# Patient Record
Sex: MALE | Race: WHITE | NOT HISPANIC OR LATINO | Employment: OTHER | URBAN - NONMETROPOLITAN AREA
[De-identification: names, ages, dates, MRNs, and addresses within clinical notes are randomized per-mention and may not be internally consistent; named-entity substitution may affect disease eponyms.]

---

## 2021-05-31 ENCOUNTER — HOSPITAL ENCOUNTER (INPATIENT)
Facility: HOSPITAL | Age: 61
LOS: 2 days | Discharge: HOME | DRG: 045 | End: 2021-06-02
Attending: EMERGENCY MEDICINE | Admitting: STUDENT IN AN ORGANIZED HEALTH CARE EDUCATION/TRAINING PROGRAM
Payer: COMMERCIAL

## 2021-05-31 ENCOUNTER — APPOINTMENT (EMERGENCY)
Dept: CT IMAGING | Facility: HOSPITAL | Age: 61
DRG: 045 | End: 2021-05-31
Payer: COMMERCIAL

## 2021-05-31 DIAGNOSIS — H53.9 VISUAL DISTURBANCE: ICD-10-CM

## 2021-05-31 DIAGNOSIS — I63.9 CVA (CEREBRAL VASCULAR ACCIDENT) (HCC): ICD-10-CM

## 2021-05-31 DIAGNOSIS — I10 HYPERTENSION: Primary | ICD-10-CM

## 2021-05-31 PROBLEM — E78.5 HYPERLIPIDEMIA: Status: ACTIVE | Noted: 2021-05-31

## 2021-05-31 LAB
ANION GAP SERPL CALCULATED.3IONS-SCNC: 8 MMOL/L (ref 4–13)
APTT PPP: 30 SECONDS (ref 23–37)
ATRIAL RATE: 80 BPM
BUN SERPL-MCNC: 20 MG/DL (ref 5–25)
CALCIUM SERPL-MCNC: 9.1 MG/DL (ref 8.3–10.1)
CHLORIDE SERPL-SCNC: 101 MMOL/L (ref 100–108)
CO2 SERPL-SCNC: 26 MMOL/L (ref 21–32)
CREAT SERPL-MCNC: 0.98 MG/DL (ref 0.6–1.3)
ERYTHROCYTE [DISTWIDTH] IN BLOOD BY AUTOMATED COUNT: 16.1 % (ref 11.6–15.1)
GFR SERPL CREATININE-BSD FRML MDRD: 82 ML/MIN/1.73SQ M
GLUCOSE SERPL-MCNC: 113 MG/DL (ref 65–140)
GLUCOSE SERPL-MCNC: 79 MG/DL (ref 65–140)
HCT VFR BLD AUTO: 43.3 % (ref 36.5–49.3)
HGB BLD-MCNC: 14.6 G/DL (ref 12–17)
INR PPP: 0.95 (ref 0.84–1.19)
MCH RBC QN AUTO: 28.7 PG (ref 26.8–34.3)
MCHC RBC AUTO-ENTMCNC: 33.7 G/DL (ref 31.4–37.4)
MCV RBC AUTO: 85 FL (ref 82–98)
P AXIS: 61 DEGREES
PLATELET # BLD AUTO: 215 THOUSANDS/UL (ref 149–390)
PMV BLD AUTO: 10.5 FL (ref 8.9–12.7)
POTASSIUM SERPL-SCNC: 4.7 MMOL/L (ref 3.5–5.3)
PR INTERVAL: 164 MS
PROTHROMBIN TIME: 12.5 SECONDS (ref 11.6–14.5)
QRS AXIS: 44 DEGREES
QRSD INTERVAL: 102 MS
QT INTERVAL: 394 MS
QTC INTERVAL: 454 MS
RBC # BLD AUTO: 5.08 MILLION/UL (ref 3.88–5.62)
SARS-COV-2 RNA RESP QL NAA+PROBE: NEGATIVE
SODIUM SERPL-SCNC: 135 MMOL/L (ref 136–145)
T WAVE AXIS: 65 DEGREES
TROPONIN I SERPL-MCNC: <0.02 NG/ML
VENTRICULAR RATE: 80 BPM
WBC # BLD AUTO: 7.78 THOUSAND/UL (ref 4.31–10.16)

## 2021-05-31 PROCEDURE — 70498 CT ANGIOGRAPHY NECK: CPT

## 2021-05-31 PROCEDURE — 93010 ELECTROCARDIOGRAM REPORT: CPT | Performed by: INTERNAL MEDICINE

## 2021-05-31 PROCEDURE — U0005 INFEC AGEN DETEC AMPLI PROBE: HCPCS | Performed by: EMERGENCY MEDICINE

## 2021-05-31 PROCEDURE — 99223 1ST HOSP IP/OBS HIGH 75: CPT | Performed by: STUDENT IN AN ORGANIZED HEALTH CARE EDUCATION/TRAINING PROGRAM

## 2021-05-31 PROCEDURE — 84484 ASSAY OF TROPONIN QUANT: CPT | Performed by: EMERGENCY MEDICINE

## 2021-05-31 PROCEDURE — 96374 THER/PROPH/DIAG INJ IV PUSH: CPT

## 2021-05-31 PROCEDURE — 85730 THROMBOPLASTIN TIME PARTIAL: CPT | Performed by: EMERGENCY MEDICINE

## 2021-05-31 PROCEDURE — 85027 COMPLETE CBC AUTOMATED: CPT | Performed by: EMERGENCY MEDICINE

## 2021-05-31 PROCEDURE — 82948 REAGENT STRIP/BLOOD GLUCOSE: CPT

## 2021-05-31 PROCEDURE — 70496 CT ANGIOGRAPHY HEAD: CPT

## 2021-05-31 PROCEDURE — 36415 COLL VENOUS BLD VENIPUNCTURE: CPT | Performed by: EMERGENCY MEDICINE

## 2021-05-31 PROCEDURE — 80048 BASIC METABOLIC PNL TOTAL CA: CPT | Performed by: EMERGENCY MEDICINE

## 2021-05-31 PROCEDURE — 99285 EMERGENCY DEPT VISIT HI MDM: CPT | Performed by: EMERGENCY MEDICINE

## 2021-05-31 PROCEDURE — 99285 EMERGENCY DEPT VISIT HI MDM: CPT

## 2021-05-31 PROCEDURE — U0003 INFECTIOUS AGENT DETECTION BY NUCLEIC ACID (DNA OR RNA); SEVERE ACUTE RESPIRATORY SYNDROME CORONAVIRUS 2 (SARS-COV-2) (CORONAVIRUS DISEASE [COVID-19]), AMPLIFIED PROBE TECHNIQUE, MAKING USE OF HIGH THROUGHPUT TECHNOLOGIES AS DESCRIBED BY CMS-2020-01-R: HCPCS | Performed by: EMERGENCY MEDICINE

## 2021-05-31 PROCEDURE — 85610 PROTHROMBIN TIME: CPT | Performed by: EMERGENCY MEDICINE

## 2021-05-31 PROCEDURE — 93005 ELECTROCARDIOGRAM TRACING: CPT

## 2021-05-31 RX ORDER — PRAVASTATIN SODIUM 80 MG/1
80 TABLET ORAL
Status: DISCONTINUED | OUTPATIENT
Start: 2021-05-31 | End: 2021-05-31

## 2021-05-31 RX ORDER — ATORVASTATIN CALCIUM 40 MG/1
40 TABLET, FILM COATED ORAL
Status: DISCONTINUED | OUTPATIENT
Start: 2021-05-31 | End: 2021-06-02 | Stop reason: HOSPADM

## 2021-05-31 RX ORDER — ASPIRIN 81 MG/1
81 TABLET, CHEWABLE ORAL DAILY
Status: DISCONTINUED | OUTPATIENT
Start: 2021-05-31 | End: 2021-06-02 | Stop reason: HOSPADM

## 2021-05-31 RX ORDER — ATORVASTATIN CALCIUM 40 MG/1
40 TABLET, FILM COATED ORAL EVERY EVENING
Status: DISCONTINUED | OUTPATIENT
Start: 2021-05-31 | End: 2021-05-31

## 2021-05-31 RX ORDER — ASPIRIN 81 MG/1
TABLET, CHEWABLE ORAL
Status: ON HOLD | COMMUNITY
End: 2021-06-02 | Stop reason: SDUPTHER

## 2021-05-31 RX ORDER — ASPIRIN 81 MG/1
81 TABLET, CHEWABLE ORAL DAILY
Status: DISCONTINUED | OUTPATIENT
Start: 2021-05-31 | End: 2021-05-31

## 2021-05-31 RX ORDER — METOPROLOL TARTRATE 5 MG/5ML
5 INJECTION INTRAVENOUS ONCE
Status: COMPLETED | OUTPATIENT
Start: 2021-05-31 | End: 2021-05-31

## 2021-05-31 RX ORDER — ROSUVASTATIN CALCIUM 10 MG/1
TABLET, COATED ORAL
COMMUNITY
Start: 2021-03-08 | End: 2021-06-02 | Stop reason: HOSPADM

## 2021-05-31 RX ORDER — CLOPIDOGREL BISULFATE 75 MG/1
300 TABLET ORAL ONCE
Status: COMPLETED | OUTPATIENT
Start: 2021-05-31 | End: 2021-05-31

## 2021-05-31 RX ADMIN — CLOPIDOGREL BISULFATE 300 MG: 75 TABLET ORAL at 10:38

## 2021-05-31 RX ADMIN — ATORVASTATIN CALCIUM 40 MG: 40 TABLET, FILM COATED ORAL at 17:23

## 2021-05-31 RX ADMIN — METOROPROLOL TARTRATE 5 MG: 5 INJECTION, SOLUTION INTRAVENOUS at 10:39

## 2021-05-31 RX ADMIN — ASPIRIN 81 MG: 81 TABLET, CHEWABLE ORAL at 12:56

## 2021-05-31 RX ADMIN — IOHEXOL 85 ML: 350 INJECTION, SOLUTION INTRAVENOUS at 10:22

## 2021-05-31 RX ADMIN — ENOXAPARIN SODIUM 40 MG: 40 INJECTION SUBCUTANEOUS at 12:56

## 2021-05-31 NOTE — ASSESSMENT & PLAN NOTE
Patient presenting with blurring of vision in his right eye which was initially accompanied by weakness of his right upper and lower extremities  Patient came to the ED due to persistence of blurring of vision and stroke alert was called  Neurology consulted and recommended admission under stroke pathway  Patient was given Plavix 300 mg in the ED  Patient to be maintained on aspirin 81 mg daily  Atorvastatin 40 mg q h s    MRI tomorrow  TTE tomorrow  Follow-up lipid panel, hemoglobin A1c, B12 and folate  Neurology consulted, recommendations appreciated  Telemetry for now

## 2021-05-31 NOTE — QUICK NOTE
Called by  regarding stroke alert at 10:04, neuro response was immediate  68-year-old man  presenting with  Right arm and leg numbness and blurry vision  Patient reports symptoms started approximately 2200 yesterday evening  Patient notes that the sensory symptoms have resolved however still reports   Some blurry vision   -   Eeg reports possible slight right facial weakness on exam, otherwise no focal deficits   - NIHSS   One, right facial weakness  - LKW  2200 yesterday evening      CTH  unremarkable  CTA  unremarkable      IV tpa was  Not given due to low NIH stroke scale score/ non disabling symptoms/ outside time window      Recommend   Loading Plavix 300 mg times once then continue 75 mg daily     -Continue aspirin 81 mg daily from home  -Recommend admitting to stroke workup     -Permissive hypertension, goal systolic  Less than 942   -MRI brain   -Echocardiogram     -FLP, A1c, B12, folate,   - tele monitoring  -PT /OT eval and treat      - will follow

## 2021-05-31 NOTE — CONSULTS
Consult for stroke  PT presented with Right arm and leg numbness and blurry vision, work up for stroke, per H&P PT with hx of high cholesterol  Reviewed labs, currently no lipid panel, no HbA1c obtained at this time  PT reported no issues with chewing or swallowing, able to feed self with no concerns,  no n/v/d, no weight change concerns, BMI 29 4  PT doesn't follow any special diet at home, no food intolerances or food allergies  PT usually eats 2 meals a day, typically skips B, burger and fries for L and chicken/fish/meat with starch for D  Due to hx of high cholesterol offered heart healthy diet education but PT declined  Recommend cardiac diet  Will continue to monitor labs and plan of care

## 2021-05-31 NOTE — PLAN OF CARE
Problem: NEUROSENSORY - ADULT  Goal: Achieves stable or improved neurological status  Description: INTERVENTIONS  - Monitor and report changes in neurological status  - Monitor vital signs such as temperature, blood pressure, glucose, and any other labs ordered   - Initiate measures to prevent increased intracranial pressure  - Monitor for seizure activity and implement precautions if appropriate      Outcome: Progressing  Goal: Remains free of injury related to seizures activity  Description: INTERVENTIONS  - Maintain airway, patient safety  and administer oxygen as ordered  - Monitor patient for seizure activity, document and report duration and description of seizure to physician/advanced practitioner  - If seizure occurs,  ensure patient safety during seizure  - Reorient patient post seizure  - Seizure pads on all 4 side rails  - Instruct patient/family to notify RN of any seizure activity including if an aura is experienced  - Instruct patient/family to call for assistance with activity based on nursing assessment  - Administer anti-seizure medications if ordered    Outcome: Progressing  Goal: Achieves maximal functionality and self care  Description: INTERVENTIONS  - Monitor swallowing and airway patency with patient fatigue and changes in neurological status  - Encourage and assist patient to increase activity and self care     - Encourage visually impaired, hearing impaired and aphasic patients to use assistive/communication devices  Outcome: Progressing     Problem: CARDIOVASCULAR - ADULT  Goal: Maintains optimal cardiac output and hemodynamic stability  Description: INTERVENTIONS:  - Monitor I/O, vital signs and rhythm  - Monitor for S/S and trends of decreased cardiac output  - Administer and titrate ordered vasoactive medications to optimize hemodynamic stability  - Assess quality of pulses, skin color and temperature  - Assess for signs of decreased coronary artery perfusion  - Instruct patient to report change in severity of symptoms  Outcome: Progressing  Goal: Absence of cardiac dysrhythmias or at baseline rhythm  Description: INTERVENTIONS:  - Continuous cardiac monitoring, vital signs, obtain 12 lead EKG if ordered  - Administer antiarrhythmic and heart rate control medications as ordered  - Monitor electrolytes and administer replacement therapy as ordered  Outcome: Progressing     Problem: HEMATOLOGIC - ADULT  Goal: Maintains hematologic stability  Description: INTERVENTIONS  - Assess for signs and symptoms of bleeding or hemorrhage  - Monitor labs  - Administer supportive blood products/factors as ordered and appropriate  Outcome: Progressing     Problem: SAFETY ADULT  Goal: Patient will remain free of falls  Description: INTERVENTIONS:  - Assess patient frequently for physical needs  -  Identify cognitive and physical deficits and behaviors that affect risk of falls    -  [place] fall precautions as indicated by assessment   - Educate patient/family on patient safety including physical limitations  - Instruct patient to call for assistance with activity based on assessment  - Modify environment to reduce risk of injury  - Consider OT/PT consult to assist with strengthening/mobility  Outcome: Progressing  Goal: Maintain or return to baseline ADL function  Description: INTERVENTIONS:  -  Assess patient's ability to carry out ADLs; assess patient's baseline for ADL function and identify physical deficits which impact ability to perform ADLs (bathing, care of mouth/teeth, toileting, grooming, dressing, etc )  - Assess/evaluate cause of self-care deficits   - Assess range of motion  - Assess patient's mobility; develop plan if impaired  - Assess patient's need for assistive devices and provide as appropriate  - Encourage maximum independence but intervene and supervise when necessary  - Involve family in performance of ADLs  - Assess for home care needs following discharge   - Consider OT consult to assist with ADL evaluation and planning for discharge  - Provide patient education as appropriate  Outcome: Progressing  Goal: Maintain or return mobility status to optimal level  Description: INTERVENTIONS:  - Assess patient's baseline mobility status (ambulation, transfers, stairs, etc )    - Identify cognitive and physical deficits and behaviors that affect mobility  - Identify mobility aids required to assist with transfers and/or ambulation (gait belt, sit-to-stand, lift, walker, cane, etc )  - [place] fall precautions as indicated by assessment  - Record patient progress and toleration of activity level on Mobility SBAR; progress patient to next Phase/Stage  - Instruct patient to call for assistance with activity based on assessment  - Consider rehabilitation consult to assist with strengthening/weightbearing, etc   Outcome: Progressing     Problem: Knowledge Deficit  Goal: Patient/family/caregiver demonstrates understanding of disease process, treatment plan, medications, and discharge instructions  Description: Complete learning assessment and assess knowledge base    Interventions:  - Provide teaching at level of understanding  - Provide teaching via preferred learning methods  Outcome: Progressing

## 2021-05-31 NOTE — ASSESSMENT & PLAN NOTE
Patient had not been following with a primary care provider for 10 years up until recently (around March)  Blood pressure has been elevated however was not started on any antihypertensives by his new primary care provider  Will do permissive hypertension for now as per Neurology recommendations  Patient will likely need to be started on an antihypertensive prior to discharge  Continue BP monitoring

## 2021-05-31 NOTE — ASSESSMENT & PLAN NOTE
· Patient presenting with blurring of vision in his right eye which was initially accompanied by weakness of his right upper and lower extremities the numbness and weakness has resolved his right eye blurry vision is improving  · Patient had an MRI of the brain done- Acute infarct left occipital region in the distribution of the left posterior cerebral artery  · Patient was loaded with Plavix 300 mg daily to continue Plavix 75 mg p o  Daily along with his aspirin 81 mg daily as recommended by Neurology formal consult to be done today  · Atorvastatin 40 mg q h s  LDL is uncontrolled  · His 2D echo has no thrombus or any indication of a PFO listed  · Hemoglobin A1c is 5 9 in prediabetic range  Patient will need diet controlled  · No AFib on telemetry  · CTA of the head and neck vessels- Mild smooth left ICA stenosis secondary to atheromatous plaque  Stenosis is measured at less than 50%    · Sidney Shorts is more than 24 hours out of the acute stroke will start lowering the blood pressure will start lisinopril 20 mg daily as his blood pressure is uncontrolled

## 2021-05-31 NOTE — ED PROVIDER NOTES
History  Chief Complaint   Patient presents with    Decreased Visual Acuity     pt noticed rightarm and righted numbness @ 10pm lastnight  shortly thereafter noticed black spots in his vision that are still there  Numbness is gone since midnight  Patient states that last night around 10:00 p m  He developed right-sided numbness including his arm and leg  No change in speech  Complains of blurry vision with spots in front of the his right visual field since yesterday  The right-sided numbness resolved after 1 hour  However, the patient continues to complain of blurry vision  No chest pain or shortness of breath  No headaches  Takes aspirin daily  States he had a similar episode 2 years ago but was never seen for it  Not diabetic  Does have a history of high cholesterol  History provided by:  Patient   used: No    CVA/TIA-like Symptoms  Presenting symptoms: sensory loss and visual change    Presenting symptoms: no headaches    Last known well instant: 10:00 p m  Last night  Onset quality:  Sudden  Duration:  1 hour  Timing:  Constant  Progression:  Improving  Similar to previous episodes: yes    Associated symptoms: no chest pain, no trouble swallowing, no dizziness, no fever, no hearing loss, no nausea, no neck pain, no seizures, no tinnitus and no vomiting        Prior to Admission Medications   Prescriptions Last Dose Informant Patient Reported? Taking?   aspirin (Aspirin 81) 81 mg chewable tablet 5/30/2021 at Unknown time  Yes Yes   Sig: Chew   rosuvastatin (CRESTOR) 10 MG tablet 5/30/2021 at Unknown time  Yes Yes   Sig: Take by mouth      Facility-Administered Medications: None       Past Medical History:   Diagnosis Date    History of high cholesterol     Hypertension        Past Surgical History:   Procedure Laterality Date    HERNIA REPAIR         History reviewed  No pertinent family history    I have reviewed and agree with the history as documented  E-Cigarette/Vaping     E-Cigarette/Vaping Substances     Social History     Tobacco Use    Smoking status: Never Smoker    Smokeless tobacco: Never Used   Substance Use Topics    Alcohol use: Yes     Frequency: 4 or more times a week     Drinks per session: 3 or 4     Binge frequency: Never    Drug use: Never       Review of Systems   Constitutional: Negative for chills and fever  HENT: Negative for ear pain, hearing loss, sore throat, tinnitus, trouble swallowing and voice change  Eyes: Positive for visual disturbance  Negative for pain and discharge  Respiratory: Negative for cough, shortness of breath and wheezing  Cardiovascular: Negative for chest pain and palpitations  Gastrointestinal: Negative for abdominal pain, blood in stool, constipation, diarrhea, nausea and vomiting  Genitourinary: Negative for dysuria, flank pain, frequency and hematuria  Musculoskeletal: Negative for joint swelling, neck pain and neck stiffness  Skin: Negative for rash and wound  Neurological: Positive for numbness  Negative for dizziness, seizures, syncope, facial asymmetry and headaches  Psychiatric/Behavioral: Negative for hallucinations, self-injury and suicidal ideas  All other systems reviewed and are negative  Physical Exam  Physical Exam  Vitals signs and nursing note reviewed  Constitutional:       General: He is not in acute distress  Appearance: He is well-developed  HENT:      Head: Normocephalic and atraumatic  Right Ear: External ear normal       Left Ear: External ear normal       Mouth/Throat:      Comments: Questionable right facial droop  Patient states he does not have any symptoms per him  Eyes:      Conjunctiva/sclera: Conjunctivae normal       Pupils: Pupils are equal, round, and reactive to light  Neck:      Musculoskeletal: Normal range of motion and neck supple  Cardiovascular:      Rate and Rhythm: Normal rate and regular rhythm        Heart sounds: Normal heart sounds  No murmur  Pulmonary:      Effort: Pulmonary effort is normal       Breath sounds: Normal breath sounds  No wheezing or rales  Abdominal:      General: Bowel sounds are normal  There is no distension  Palpations: Abdomen is soft  Tenderness: There is no abdominal tenderness  There is no guarding or rebound  Musculoskeletal: Normal range of motion  General: No deformity  Skin:     General: Skin is warm and dry  Findings: No rash  Neurological:      General: No focal deficit present  Mental Status: He is alert and oriented to person, place, and time  Cranial Nerves: No cranial nerve deficit  Psychiatric:         Behavior: Behavior normal          Vital Signs  ED Triage Vitals [05/31/21 0951]   Temperature Pulse Respirations Blood Pressure SpO2   97 6 °F (36 4 °C) 84 18 (!) 192/113 97 %      Temp Source Heart Rate Source Patient Position - Orthostatic VS BP Location FiO2 (%)   Temporal Monitor Lying Left arm --      Pain Score       No Pain           Vitals:    05/31/21 1030 05/31/21 1045 05/31/21 1100 05/31/21 1110   BP:   (!) 164/109    Pulse: 80 68 64 60   Patient Position - Orthostatic VS:             Visual Acuity  Visual Acuity      Most Recent Value   L Pupil Size (mm)  4   R Pupil Size (mm)  4          ED Medications  Medications   iohexol (OMNIPAQUE) 350 MG/ML injection (SINGLE-DOSE) 85 mL (85 mL Intravenous Given 5/31/21 1022)   metoprolol (LOPRESSOR) injection 5 mg (5 mg Intravenous Given 5/31/21 1039)   clopidogrel (PLAVIX) tablet 300 mg (300 mg Oral Given 5/31/21 1038)       Diagnostic Studies  Results Reviewed     Procedure Component Value Units Date/Time    Troponin I [[id number]]  (Normal) Collected: 05/31/21 1005    Lab Status: Final result Specimen: Blood from Arm, Left Updated: 05/31/21 1038     Troponin I <0 02 ng/mL     Novel Coronavirus (Covid-19),PCR [facility] - 2 hour stat [[id number]] Collected: 05/31/21 1005    Lab Status:  In process Specimen: Nares from Nose Updated: 05/31/21 9082    Basic metabolic panel [[id number]]  (Abnormal) Collected: 05/31/21 1005    Lab Status: Final result Specimen: Blood from Arm, Left Updated: 05/31/21 1035     Sodium 135 mmol/L      Potassium 4 7 mmol/L      Chloride 101 mmol/L      CO2 26 mmol/L      ANION GAP 8 mmol/L      BUN 20 mg/dL      Creatinine 0 98 mg/dL      Glucose 113 mg/dL      Calcium 9 1 mg/dL      eGFR 82 ml/min/1 73sq m     Narrative:      [place] guidelines for Chronic Kidney Disease (CKD):     Stage 1 with normal or high GFR (GFR > 90 mL/min/1 73 square meters)    Stage 2 Mild CKD (GFR = 60-89 mL/min/1 73 square meters)    Stage 3A Moderate CKD (GFR = 45-59 mL/min/1 73 square meters)    Stage 3B Moderate CKD (GFR = 30-44 mL/min/1 73 square meters)    Stage 4 Severe CKD (GFR = 15-29 mL/min/1 73 square meters)    Stage 5 End Stage CKD (GFR <15 mL/min/1 73 square meters)  Note: GFR calculation is accurate only with a steady state creatinine    Protime-INR [[id number]]  (Normal) Collected: 05/31/21 1005    Lab Status: Final result Specimen: Blood from Arm, Left Updated: 05/31/21 1034     Protime 12 5 seconds      INR 0 95    APTT [[id number]]  (Normal) Collected: 05/31/21 1005    Lab Status: Final result Specimen: Blood from Arm, Left Updated: 05/31/21 1034     PTT 30 seconds     CBC and Platelet [[id number]]  (Abnormal) Collected: 05/31/21 1005    Lab Status: Final result Specimen: Blood from Arm, Left Updated: 05/31/21 1018     WBC 7 78 Thousand/uL      RBC 5 08 Million/uL      Hemoglobin 14 6 g/dL      Hematocrit 43 3 %      MCV 85 fL      MCH 28 7 pg      MCHC 33 7 g/dL      RDW 16 1 %      Platelets 731 Thousands/uL      MPV 10 5 fL     Fingerstick Glucose (POCT) [[id number]]  (Normal) Collected: 05/31/21 0957    Lab Status: Final result Updated: 05/31/21 0959     POC Glucose 79 mg/dl                  CT stroke alert brain   Final Result by Wilfredo Su Ab Odom DO (05/31 1031)      Loss of gray-white differentiation left occipital lobe suspicious for recent infarct  Findings were directly discussed with Daniel Poe on 5/31/2021 10:28 AM       Workstation performed: [id number]         CTA stroke alert (head/neck)   Final Result by Stella Lamb DO (05/31 1048)      Mild smooth left ICA stenosis secondary to atheromatous plaque  Stenosis is measured at less than 50%  No focal intracranial stenosis or aneurysm  Findings were directly discussed with Daniel Poe on 5/31/2021 10:31 AM                      Workstation performed: [id number]                    Procedures  ECG 12 Lead Documentation Only    Date/Time: 5/31/2021 9:53 AM  Performed by: Yair Jane MD  Authorized by: Yair Jane MD     ECG reviewed by me, the ED Provider: yes    Patient location:  ED  Previous ECG:     Previous ECG:  Unavailable  Rate:     ECG rate:  80  Rhythm:     Rhythm: sinus rhythm    Ectopy:     Ectopy: none    QRS:     QRS axis:  Normal             ED Course  ED Course as of May 31 1111   Mon May 31, 2021   0956 Stroke alert called at 9:50 a m                       Stroke Assessment     Row Name 05/31/21 1454             NIH Stroke Scale    Interval  Baseline      Level of Consciousness (1a )  0      LOC Questions (1b )  0      LOC Commands (1c )  0      Best Gaze (2 )  0      Visual (3 )  0      Facial Palsy (4 )  1 There is a questionable very minor right facial droop  Patient has no symptom all G  Unsure if this is his baseline  Motor Arm, Left (5a )  0      Motor Arm, Right (5b )  0      Motor Leg, Left (6a )  0      Motor Leg, Right (6b )  0      Limb Ataxia (7 )  0      Sensory (8 )  0      Best Language (9 )  0      Dysarthria (10 )  0      Extinction and Inattention (11 ) (Formerly Neglect)  0      Total  1          First Filed Value   TPA Decision  Patient not a TPA candidate  [Symptoms started at 10:00 p m  Last night  Patient is out of the tPA window ]                  SBIRT 20yo+      Most Recent Value   SBIRT (24 yo +)   In order to provide better care to our patients, we are screening all of our patients for alcohol and drug use  Would it be okay to ask you these screening questions? Yes Filed at: 05/31/2021 1043   Initial Alcohol Screen: US AUDIT-C    1  How often do you have a drink containing alcohol? 6 Filed at: 05/31/2021 1043   2  How many drinks containing alcohol do you have on a typical day you are drinking? 1 Filed at: 05/31/2021 1043   3a  Male UNDER 65: How often do you have five or more drinks on one occasion? 0 Filed at: 05/31/2021 1043   Audit-C Score  7 Filed at: 05/31/2021 1043   Full Alcohol Screen: US AUDIT   4  How often during the last year have you found that you were not able to stop drinking once you had started? 0 Filed at: 05/31/2021 1043   5  How often during past year have you failed to do what was normally expected of you because of drinking? 0 Filed at: 05/31/2021 1043   6  How often in past year have you needed a first drink in the morning to get yourself going after a heavy drinking session? 0 Filed at: 05/31/2021 1043   7  How often in past year have you had feeling of guilt or remorse after drinking? 0 Filed at: 05/31/2021 1043   8  How often in past year have you been unable to remember what happened night before because you had been drinking? 0 Filed at: 05/31/2021 1043   9  Have you or someone else been injured as a result of your drinking? 0 Filed at: 05/31/2021 1043   10  Has a relative, friend, doctor or other health worker been concerned about your drinking and suggested you cut down?   0 Filed at: 05/31/2021 1043   AUDIT Total Score  7 Filed at: 05/31/2021 1043   KASEY: How many times in the past year have you    Used an illegal drug or used a prescription medication for non-medical reasons?   Never Filed at: 05/31/2021 1043                    MDM  Number of Diagnoses or Management Options  Hypertension:   TIA (transient ischemic attack):   Visual disturbance:   Diagnosis management comments: Case was discussed with Neurology on-call, Dr Angel Tavares  Patient is not a tPA candidate as the symptoms started over 12 hours ago  He recommends admit for stroke pathway  Plavix 300 mg once then continue at 75 mg daily  Amount and/or Complexity of Data Reviewed  Clinical lab tests: reviewed  Review and summarize past medical records: yes        Disposition  Final diagnoses:   Hypertension   Visual disturbance   CVA (cerebral vascular accident) ([facility] [address] )     Time reflects when diagnosis was documented in both MDM as applicable and the Disposition within this note     Time User Action Codes Description Comment    5/31/2021 10:32 AM Charisma Lundy Castellaheriberto Add [I10] Hypertension     5/31/2021 10:32 AM Charisma Lundy Castellani Add [G45 9] TIA (transient ischemic attack)     5/31/2021 10:32 AM Jocelyn Lundy Add [H53 9] Visual disturbance     5/31/2021 11:11 AM Charisma Lundy Castbunnyni Remove [G45 9] TIA (transient ischemic attack)     5/31/2021 11:11 AM Kamron Charisma Castellani Add [I63 9] CVA (cerebral vascular accident) [facility])       ED Disposition     ED Disposition Condition Date/Time Comment    Admit Stable Mon May 31, 2021 10:44 AM Case was discussed with Dr Imelda Painting and the patient's admission status was agreed to be  to the service of Dr Savannah Torres    None         Patient's Medications   Discharge Prescriptions    No medications on file     No discharge procedures on file      PDMP Review     None          ED Provider  Electronically Signed by           Destiny Oneil MD  05/31/21 [address] [facility] Pepe Waite MD  05/31/21 0716

## 2021-05-31 NOTE — H&P
[address] Nelson  H&P- Ludy Debra 1960, 56 y o  male MRN: [id number]  Unit/Bed#: -[facility] Encounter: [id number]  Primary Care Provider: Carmela Huerta DO   Date and time admitted to hospital: 5/31/2021  9:49 AM    Hyperlipidemia  Assessment & Plan  Continue aspirin and atorvastatin  Follow-up lipid panel    Essential hypertension  Assessment & Plan  Patient had not been following with a primary care provider for 10 years up until recently (around March)  Blood pressure has been elevated however was not started on any antihypertensives by his new primary care provider  Will do permissive hypertension for now as per Neurology recommendations  Patient will likely need to be started on an antihypertensive prior to discharge  Continue BP monitoring    CVA (cerebral vascular accident) [facility])  Assessment & Plan  Patient presenting with blurring of vision in his right eye which was initially accompanied by weakness of his right upper and lower extremities  Patient came to the ED due to persistence of blurring of vision and stroke alert was called  Neurology consulted and recommended admission under stroke pathway  Patient was given Plavix 300 mg in the ED  Patient to be maintained on aspirin 81 mg daily  Atorvastatin 40 mg q h s  MRI tomorrow  TTE tomorrow  Follow-up lipid panel, hemoglobin A1c, B12 and folate  Neurology consulted, recommendations appreciated  Telemetry for now      VTE Prophylaxis: Enoxaparin (Lovenox)  / sequential compression device   Code Status: FULL CODE  POLST: There is no POLST form on file for this patient (pre-hospital)  Discussion with family: patient and family    Anticipated Length of Stay:  Patient will be admitted on an Inpatient basis with an anticipated length of stay of  More than 2 midnights  Justification for Hospital Stay: stroke work-up    Total Time for Visit, including Counseling / Coordination of Care: 45 minutes    Greater than 50% of this total time spent on direct patient counseling and coordination of care  Chief Complaint:   Blurring of vision in right eye    History of Present Illness:    La A[place]as is a 56 y o  male who presents with blurring of vision in right eye that had started at about the same time as right upper and lower extremity weakness - 9 - 10 pm on the night prior to admission  The patient stated that he just went to bed despite his symptoms  When he woke up this am, the right upper and lower extremity weakness resolved however the blurring of vision in his right eye persisted prompting him to come to the ED  He endorses that he sees floaters and has some blurring of peripheral vision  Denies any other symptoms such as palpitations, lightheadedness, difficulty swallowing, difficulty ambulating  He does endorse some mild headache that seems to be related to his sinus congestion  Of note, the patient endorses a similar event in the past which resolved spontaneously  Review of Systems:    Review of Systems   Constitutional: Negative for chills and fever  HENT: Negative for ear pain and sore throat  Eyes: Positive for visual disturbance  Negative for pain  Respiratory: Negative for cough and shortness of breath  Cardiovascular: Negative for chest pain and palpitations  Gastrointestinal: Negative for abdominal pain and vomiting  Genitourinary: Negative for dysuria and hematuria  Musculoskeletal: Negative for arthralgias and back pain  Skin: Negative for color change and rash  Neurological: Positive for weakness and numbness  Negative for seizures and syncope  All other systems reviewed and are negative           Past Medical and Surgical History:     Past Medical History:   Diagnosis Date    History of high cholesterol     Hypertension        Past Surgical History:   Procedure Laterality Date    HERNIA REPAIR         Meds/Allergies:    Prior to Admission medications    Medication Sig Start Date End Date Taking? Authorizing Provider   aspirin (Aspirin 81) 81 mg chewable tablet Chew   Yes Historical Provider, MD   rosuvastatin (CRESTOR) 10 MG tablet Take by mouth 3/8/21  Yes Historical Provider, MD     I have reviewed home medications with patient personally  Allergies: No Known Allergies    Social History:     Marital Status: Single   Occupation: retired  Patient Pre-hospital Living Situation: at home  Patient Pre-hospital Level of Mobility: ambulates independently  Patient Pre-hospital Diet Restrictions: none  Substance Use History:   Social History     Substance and Sexual Activity   Alcohol Use Yes    Frequency: 4 or more times a week    Drinks per session: 3 or 4    Binge frequency: Never     Social History     Tobacco Use   Smoking Status Never Smoker   Smokeless Tobacco Never Used     Social History     Substance and Sexual Activity   Drug Use Never       Family History:    stroke - mother    Physical Exam:     Vitals:   Blood Pressure: (!) 177/115 (05/31/21 1330)  Pulse: 69 (05/31/21 1330)  Temperature: 98 °F (36 7 °C) (05/31/21 1330)  Temp Source: Oral (05/31/21 1230)  Respirations: 19 (05/31/21 1330)  Height: 5' 9" (175 3 cm) (05/31/21 0951)  Weight - Scale: 90 3 kg (199 lb 1 2 oz) (05/31/21 0951)  SpO2: 95 % (05/31/21 1330)    Physical Exam  Vitals signs and nursing note reviewed  Constitutional:       Appearance: He is well-developed  HENT:      Head: Normocephalic and atraumatic  Eyes:      Conjunctiva/sclera: Conjunctivae normal    Neck:      Musculoskeletal: Neck supple  Cardiovascular:      Rate and Rhythm: Normal rate and regular rhythm  Heart sounds: No murmur  Pulmonary:      Effort: Pulmonary effort is normal  No respiratory distress  Breath sounds: Normal breath sounds  Abdominal:      Palpations: Abdomen is soft  Tenderness: There is no abdominal tenderness  Skin:     General: Skin is warm and dry     Neurological:      Mental Status: He is alert and oriented to person, place, and time  Sensory: No sensory deficit  Motor: No weakness  Gait: Gait normal              Additional Data:     Lab Results: I have personally reviewed pertinent reports  Results from last 7 days   Lab Units 05/31/21  1005   WBC Thousand/uL 7 78   HEMOGLOBIN g/dL 14 6   HEMATOCRIT % 43 3   PLATELETS Thousands/uL 215     Results from last 7 days   Lab Units 05/31/21  1005   SODIUM mmol/L 135*   POTASSIUM mmol/L 4 7   CHLORIDE mmol/L 101   CO2 mmol/L 26   BUN mg/dL 20   CREATININE mg/dL 0 98   ANION GAP mmol/L 8   CALCIUM mg/dL 9 1   GLUCOSE RANDOM mg/dL 113     Results from last 7 days   Lab Units 05/31/21  1005   INR  0 95     Results from last 7 days   Lab Units 05/31/21  0957   POC GLUCOSE mg/dl 79               Imaging: I have personally reviewed pertinent reports  CT stroke alert brain   Final Result by Alexandria Goyal DO (05/31 1031)      Loss of gray-white differentiation left occipital lobe suspicious for recent infarct  Findings were directly discussed with Ben Dietz on 5/31/2021 10:28 AM       Workstation performed: [id number]         CTA stroke alert (head/neck)   Final Result by Alexandria Goyal DO (05/31 1048)      Mild smooth left ICA stenosis secondary to atheromatous plaque  Stenosis is measured at less than 50%  No focal intracranial stenosis or aneurysm  Findings were directly discussed with Ben Dietz on 5/31/2021 10:31 AM                      Workstation performed: [id number]         MRI Inpatient Order    (Results Pending)         Allscripts / Epic Records Reviewed: Yes     ** Please Note: This note has been constructed using a voice recognition system   **

## 2021-06-01 ENCOUNTER — APPOINTMENT (INPATIENT)
Dept: NON INVASIVE DIAGNOSTICS | Facility: HOSPITAL | Age: 61
DRG: 045 | End: 2021-06-01
Payer: COMMERCIAL

## 2021-06-01 ENCOUNTER — APPOINTMENT (INPATIENT)
Dept: MRI IMAGING | Facility: HOSPITAL | Age: 61
DRG: 045 | End: 2021-06-01
Payer: COMMERCIAL

## 2021-06-01 PROBLEM — R73.03 PREDIABETES: Status: ACTIVE | Noted: 2021-06-01

## 2021-06-01 LAB
ALBUMIN SERPL BCP-MCNC: 3.5 G/DL (ref 3.5–5)
ALP SERPL-CCNC: 79 U/L (ref 46–116)
ALT SERPL W P-5'-P-CCNC: 54 U/L (ref 12–78)
ANION GAP SERPL CALCULATED.3IONS-SCNC: 11 MMOL/L (ref 4–13)
AST SERPL W P-5'-P-CCNC: 32 U/L (ref 5–45)
BASOPHILS # BLD AUTO: 0.06 THOUSANDS/ΜL (ref 0–0.1)
BASOPHILS NFR BLD AUTO: 1 % (ref 0–1)
BILIRUB SERPL-MCNC: 0.62 MG/DL (ref 0.2–1)
BUN SERPL-MCNC: 19 MG/DL (ref 5–25)
CALCIUM SERPL-MCNC: 8.7 MG/DL (ref 8.3–10.1)
CHLORIDE SERPL-SCNC: 103 MMOL/L (ref 100–108)
CHOLEST SERPL-MCNC: 192 MG/DL (ref 50–200)
CO2 SERPL-SCNC: 24 MMOL/L (ref 21–32)
CREAT SERPL-MCNC: 1.14 MG/DL (ref 0.6–1.3)
EOSINOPHIL # BLD AUTO: 0.31 THOUSAND/ΜL (ref 0–0.61)
EOSINOPHIL NFR BLD AUTO: 5 % (ref 0–6)
ERYTHROCYTE [DISTWIDTH] IN BLOOD BY AUTOMATED COUNT: 16.1 % (ref 11.6–15.1)
EST. AVERAGE GLUCOSE BLD GHB EST-MCNC: 123 MG/DL
FOLATE SERPL-MCNC: >20 NG/ML (ref 3.1–17.5)
GFR SERPL CREATININE-BSD FRML MDRD: 69 ML/MIN/1.73SQ M
GLUCOSE SERPL-MCNC: 114 MG/DL (ref 65–140)
HBA1C MFR BLD: 5.9 %
HCT VFR BLD AUTO: 41.5 % (ref 36.5–49.3)
HDLC SERPL-MCNC: 56 MG/DL
HGB BLD-MCNC: 13.9 G/DL (ref 12–17)
IMM GRANULOCYTES # BLD AUTO: 0.02 THOUSAND/UL (ref 0–0.2)
IMM GRANULOCYTES NFR BLD AUTO: 0 % (ref 0–2)
LDLC SERPL CALC-MCNC: 104 MG/DL (ref 0–100)
LYMPHOCYTES # BLD AUTO: 1.39 THOUSANDS/ΜL (ref 0.6–4.47)
LYMPHOCYTES NFR BLD AUTO: 20 % (ref 14–44)
MAGNESIUM SERPL-MCNC: 2.1 MG/DL (ref 1.6–2.6)
MCH RBC QN AUTO: 28.5 PG (ref 26.8–34.3)
MCHC RBC AUTO-ENTMCNC: 33.5 G/DL (ref 31.4–37.4)
MCV RBC AUTO: 85 FL (ref 82–98)
MONOCYTES # BLD AUTO: 0.66 THOUSAND/ΜL (ref 0.17–1.22)
MONOCYTES NFR BLD AUTO: 10 % (ref 4–12)
NEUTROPHILS # BLD AUTO: 4.37 THOUSANDS/ΜL (ref 1.85–7.62)
NEUTS SEG NFR BLD AUTO: 64 % (ref 43–75)
NRBC BLD AUTO-RTO: 0 /100 WBCS
PHOSPHATE SERPL-MCNC: 3.6 MG/DL (ref 2.3–4.1)
PLATELET # BLD AUTO: 197 THOUSANDS/UL (ref 149–390)
PMV BLD AUTO: 10.6 FL (ref 8.9–12.7)
POTASSIUM SERPL-SCNC: 3.8 MMOL/L (ref 3.5–5.3)
PROT SERPL-MCNC: 7.3 G/DL (ref 6.4–8.2)
RBC # BLD AUTO: 4.88 MILLION/UL (ref 3.88–5.62)
SODIUM SERPL-SCNC: 138 MMOL/L (ref 136–145)
TRIGL SERPL-MCNC: 160 MG/DL
VIT B12 SERPL-MCNC: 309 PG/ML (ref 100–900)
WBC # BLD AUTO: 6.81 THOUSAND/UL (ref 4.31–10.16)

## 2021-06-01 PROCEDURE — 99232 SBSQ HOSP IP/OBS MODERATE 35: CPT | Performed by: FAMILY MEDICINE

## 2021-06-01 PROCEDURE — 85025 COMPLETE CBC W/AUTO DIFF WBC: CPT | Performed by: STUDENT IN AN ORGANIZED HEALTH CARE EDUCATION/TRAINING PROGRAM

## 2021-06-01 PROCEDURE — 80053 COMPREHEN METABOLIC PANEL: CPT | Performed by: STUDENT IN AN ORGANIZED HEALTH CARE EDUCATION/TRAINING PROGRAM

## 2021-06-01 PROCEDURE — 92610 EVALUATE SWALLOWING FUNCTION: CPT

## 2021-06-01 PROCEDURE — 80061 LIPID PANEL: CPT | Performed by: STUDENT IN AN ORGANIZED HEALTH CARE EDUCATION/TRAINING PROGRAM

## 2021-06-01 PROCEDURE — 83735 ASSAY OF MAGNESIUM: CPT | Performed by: STUDENT IN AN ORGANIZED HEALTH CARE EDUCATION/TRAINING PROGRAM

## 2021-06-01 PROCEDURE — 83036 HEMOGLOBIN GLYCOSYLATED A1C: CPT | Performed by: STUDENT IN AN ORGANIZED HEALTH CARE EDUCATION/TRAINING PROGRAM

## 2021-06-01 PROCEDURE — 93306 TTE W/DOPPLER COMPLETE: CPT

## 2021-06-01 PROCEDURE — 84100 ASSAY OF PHOSPHORUS: CPT | Performed by: STUDENT IN AN ORGANIZED HEALTH CARE EDUCATION/TRAINING PROGRAM

## 2021-06-01 PROCEDURE — G1004 CDSM NDSC: HCPCS

## 2021-06-01 PROCEDURE — 70551 MRI BRAIN STEM W/O DYE: CPT

## 2021-06-01 PROCEDURE — 82607 VITAMIN B-12: CPT | Performed by: STUDENT IN AN ORGANIZED HEALTH CARE EDUCATION/TRAINING PROGRAM

## 2021-06-01 PROCEDURE — 82746 ASSAY OF FOLIC ACID SERUM: CPT | Performed by: STUDENT IN AN ORGANIZED HEALTH CARE EDUCATION/TRAINING PROGRAM

## 2021-06-01 RX ORDER — LISINOPRIL 20 MG/1
20 TABLET ORAL DAILY
Status: DISCONTINUED | OUTPATIENT
Start: 2021-06-01 | End: 2021-06-02 | Stop reason: HOSPADM

## 2021-06-01 RX ORDER — CLOPIDOGREL BISULFATE 75 MG/1
75 TABLET ORAL DAILY
Status: DISCONTINUED | OUTPATIENT
Start: 2021-06-01 | End: 2021-06-02 | Stop reason: HOSPADM

## 2021-06-01 RX ADMIN — ENOXAPARIN SODIUM 40 MG: 40 INJECTION SUBCUTANEOUS at 08:24

## 2021-06-01 RX ADMIN — ASPIRIN 81 MG: 81 TABLET, CHEWABLE ORAL at 08:24

## 2021-06-01 RX ADMIN — CLOPIDOGREL BISULFATE 75 MG: 75 TABLET ORAL at 14:34

## 2021-06-01 RX ADMIN — LISINOPRIL 20 MG: 20 TABLET ORAL at 14:34

## 2021-06-01 RX ADMIN — ATORVASTATIN CALCIUM 40 MG: 40 TABLET, FILM COATED ORAL at 15:46

## 2021-06-01 NOTE — ASSESSMENT & PLAN NOTE
Patient had not been following with a primary care provider for 10 years up until recently (around March)  Uncontrolled past 24 hours of acute CVA will start lowering the blood pressure start lisinopril 20 mg daily

## 2021-06-01 NOTE — PROGRESS NOTES
[address] Christine Nelson  Progress Note - Chasity Kessler 1960, 56 y o  male MRN: [id number]  Unit/Bed#: -01 Encounter: [id number]  Primary Care Provider: Jairo Wagner DO   Date and time admitted to hospital: 5/31/2021  9:49 AM    Prediabetes  Assessment & Plan  · Hemoglobin A1c is 5 9 needs the outpatient diet control    Hyperlipidemia  Assessment & Plan  Continue aspirin and atorvastatin  Follow-up lipid panel uncontrolled LDL    Essential hypertension  Assessment & Plan  Patient had not been following with a primary care provider for 10 years up until recently (around March)  Uncontrolled past 24 hours of acute CVA will start lowering the blood pressure start lisinopril 20 mg daily    * CVA (cerebral vascular accident) [facility])  Assessment & Plan  · Patient presenting with blurring of vision in his right eye which was initially accompanied by weakness of his right upper and lower extremities the numbness and weakness has resolved his right eye blurry vision is improving  · Patient had an MRI of the brain done- Acute infarct left occipital region in the distribution of the left posterior cerebral artery  · Patient was loaded with Plavix 300 mg daily to continue Plavix 75 mg p o  Daily along with his aspirin 81 mg daily as recommended by Neurology formal consult to be done today  · Atorvastatin 40 mg q h s  LDL is uncontrolled  · His 2D echo has no thrombus or any indication of a PFO listed  · Hemoglobin A1c is 5 9 in prediabetic range  Patient will need diet controlled  · No AFib on telemetry  · CTA of the head and neck vessels- Mild smooth left ICA stenosis secondary to atheromatous plaque  Stenosis is measured at less than 50%    · Zilphia Holiday is more than 24 hours out of the acute stroke will start lowering the blood pressure will start lisinopril 20 mg daily as his blood pressure is uncontrolled        VTE Pharmacologic Prophylaxis:   Pharmacologic: Pharmacologic VTE Prophylaxis contraindicated due to He is ambulatory  Mechanical VTE Prophylaxis in Place: Yes    Patient Centered Rounds: I have performed bedside rounds with nursing staff today  Discussions with Specialists or Other Care Team Provider:  Will discuss with neurology    Education and Discussions with Family / Patient:  Patient    Time Spent for Care: 30 minutes  More than 50% of total time spent on counseling and coordination of care as described above  Current Length of Stay: 1 day(s)    Current Patient Status: Inpatient   Certification Statement: The patient will continue to require additional inpatient hospital stay due to Secondary to acute CVA hypertension uncontrolled    Discharge Plan:  Anticipate discharge tomorrow blood pressure is improved    Code Status: Level 1 - Full Code      Subjective:   Patient seen and examined he has right-sided weakness and paresthesias has resolved his right eye blurry vision has improved    Objective:     Vitals:   Temp (24hrs), Av 2 °F (36 8 °C), Min:97 7 °F (36 5 °C), Max:98 4 °F (36 9 °C)    Temp:  [97 7 °F (36 5 °C)-98 4 °F (36 9 °C)] 98 2 °F (36 8 °C)  HR:  [59-90] 59  Resp:  [18-24] 24  BP: (144-177)/() 158/109  SpO2:  [93 %-96 %] 96 %  Body mass index is 29 4 kg/m²  Input and Output Summary (last 24 hours): Intake/Output Summary (Last 24 hours) at 2021 1405  Last data filed at 2021 1730  Gross per 24 hour   Intake 420 ml   Output --   Net 420 ml       Physical Exam:     Physical Exam  Vitals signs and nursing note reviewed  Constitutional:       Appearance: He is well-developed  HENT:      Head: Normocephalic and atraumatic  Eyes:      Conjunctiva/sclera: Conjunctivae normal    Neck:      Musculoskeletal: Neck supple  Cardiovascular:      Rate and Rhythm: Normal rate and regular rhythm  Heart sounds: No murmur  Pulmonary:      Effort: Pulmonary effort is normal  No respiratory distress  Breath sounds: Normal breath sounds  Abdominal:      Palpations: Abdomen is soft  Tenderness: There is no abdominal tenderness  Skin:     General: Skin is warm and dry  Neurological:      Mental Status: He is alert and oriented to person, place, and time  Comments: He only has evidence of right-sided blurry vision otherwise no other focal abnormality noticed           Additional Data:     Labs:    Results from last 7 days   Lab Units 06/01/21  0535   WBC Thousand/uL 6 81   HEMOGLOBIN g/dL 13 9   HEMATOCRIT % 41 5   PLATELETS Thousands/uL 197   NEUTROS PCT % 64   LYMPHS PCT % 20   MONOS PCT % 10   EOS PCT % 5     Results from last 7 days   Lab Units 06/01/21  0535   SODIUM mmol/L 138   POTASSIUM mmol/L 3 8   CHLORIDE mmol/L 103   CO2 mmol/L 24   BUN mg/dL 19   CREATININE mg/dL 1 14   ANION GAP mmol/L 11   CALCIUM mg/dL 8 7   ALBUMIN g/dL 3 5   TOTAL BILIRUBIN mg/dL 0 62   ALK PHOS U/L 79   ALT U/L 54   AST U/L 32   GLUCOSE RANDOM mg/dL 114     Results from last 7 days   Lab Units 05/31/21  1005   INR  0 95     Results from last 7 days   Lab Units 05/31/21  0957   POC GLUCOSE mg/dl 79     Results from last 7 days   Lab Units 06/01/21  0535   HEMOGLOBIN A1C % 5 9*               * I Have Reviewed All Lab Data Listed Above  * Additional Pertinent Lab Tests Reviewed:  All Labs Within Last 24 Hours Reviewed    Imaging:    Imaging Reports Reviewed Today Include:  MRI of the brain and 2D echo  Imaging Personally Reviewed by Myself Includes:      Recent Cultures (last 7 days):           Last 24 Hours Medication List:   Current Facility-Administered Medications   Medication Dose Route Frequency Provider Last Rate    aspirin  81 mg Oral Daily Gege Vivi Steel MD      atorvastatin  40 mg Oral Daily With [employer] Noé Steel MD      clopidogrel  75 mg Oral Daily Ben Cheek MD      lisinopril  20 mg Oral Daily Ben Cheek MD          Today, Patient Was Seen By: Ben Cheek MD    ** Please Note: Dictation voice to text software may have been used in the creation of this document   **

## 2021-06-01 NOTE — OCCUPATIONAL THERAPY NOTE
Occupational Therapy Screen     Patient Name: Tammie Wilson  [id number] Date: 6/1/2021  Problem List  Principal Problem:    CVA (cerebral vascular accident) [facility])  Active Problems:    Essential hypertension    Hyperlipidemia       06/01/21 1005   Note Type   Note type Screen       OT orders received  Chart review completed  Pt admitted to [facility] 5/31/2021 with Dx: CVA  MRI of brain resulted, suggests: Acute infarct left occipital region in the distribution of the left posterior cerebral artery  Per RNBrunilda Pt is completing ADLs and functional in room mobility @ I with no difficulty noted  Pt is experiencing blurred vision in R eye although at this time is not impacting functional performance  Pt reports concerns with being able to drive although no other concerns at this time regarding returning home  Pt does not require any further acute OT services  D/c OT effective 6/1/2021  If new concerns arise, please re-consult       OH Wilson/L

## 2021-06-01 NOTE — PLAN OF CARE
Problem: NEUROSENSORY - ADULT  Goal: Achieves stable or improved neurological status  Description: INTERVENTIONS  - Monitor and report changes in neurological status  - Monitor vital signs such as temperature, blood pressure, glucose, and any other labs ordered   - Initiate measures to prevent increased intracranial pressure  - Monitor for seizure activity and implement precautions if appropriate      Outcome: Progressing  Goal: Remains free of injury related to seizures activity  Description: INTERVENTIONS  - Maintain airway, patient safety  and administer oxygen as ordered  - Monitor patient for seizure activity, document and report duration and description of seizure to physician/advanced practitioner  - If seizure occurs,  ensure patient safety during seizure  - Reorient patient post seizure  - Seizure pads on all 4 side rails  - Instruct patient/family to notify RN of any seizure activity including if an aura is experienced  - Instruct patient/family to call for assistance with activity based on nursing assessment  - Administer anti-seizure medications if ordered    Outcome: Progressing  Goal: Achieves maximal functionality and self care  Description: INTERVENTIONS  - Monitor swallowing and airway patency with patient fatigue and changes in neurological status  - Encourage and assist patient to increase activity and self care     - Encourage visually impaired, hearing impaired and aphasic patients to use assistive/communication devices  Outcome: Progressing     Problem: CARDIOVASCULAR - ADULT  Goal: Maintains optimal cardiac output and hemodynamic stability  Description: INTERVENTIONS:  - Monitor I/O, vital signs and rhythm  - Monitor for S/S and trends of decreased cardiac output  - Administer and titrate ordered vasoactive medications to optimize hemodynamic stability  - Assess quality of pulses, skin color and temperature  - Assess for signs of decreased coronary artery perfusion  - Instruct patient to report change in severity of symptoms  Outcome: Progressing  Goal: Absence of cardiac dysrhythmias or at baseline rhythm  Description: INTERVENTIONS:  - Continuous cardiac monitoring, vital signs, obtain 12 lead EKG if ordered  - Administer antiarrhythmic and heart rate control medications as ordered  - Monitor electrolytes and administer replacement therapy as ordered  Outcome: Progressing     Problem: HEMATOLOGIC - ADULT  Goal: Maintains hematologic stability  Description: INTERVENTIONS  - Assess for signs and symptoms of bleeding or hemorrhage  - Monitor labs  - Administer supportive blood products/factors as ordered and appropriate  Outcome: Progressing     Problem: SAFETY ADULT  Goal: Patient will remain free of falls  Description: INTERVENTIONS:  - Assess patient frequently for physical needs  -  Identify cognitive and physical deficits and behaviors that affect risk of falls    -  [place] fall precautions as indicated by assessment   - Educate patient/family on patient safety including physical limitations  - Instruct patient to call for assistance with activity based on assessment  - Modify environment to reduce risk of injury  - Consider OT/PT consult to assist with strengthening/mobility  Outcome: Progressing  Goal: Maintain or return to baseline ADL function  Description: INTERVENTIONS:  -  Assess patient's ability to carry out ADLs; assess patient's baseline for ADL function and identify physical deficits which impact ability to perform ADLs (bathing, care of mouth/teeth, toileting, grooming, dressing, etc )  - Assess/evaluate cause of self-care deficits   - Assess range of motion  - Assess patient's mobility; develop plan if impaired  - Assess patient's need for assistive devices and provide as appropriate  - Encourage maximum independence but intervene and supervise when necessary  - Involve family in performance of ADLs  - Assess for home care needs following discharge   - Consider OT consult to assist with ADL evaluation and planning for discharge  - Provide patient education as appropriate  Outcome: Progressing  Goal: Maintain or return mobility status to optimal level  Description: INTERVENTIONS:  - Assess patient's baseline mobility status (ambulation, transfers, stairs, etc )    - Identify cognitive and physical deficits and behaviors that affect mobility  - Identify mobility aids required to assist with transfers and/or ambulation (gait belt, sit-to-stand, lift, walker, cane, etc )  - [place] fall precautions as indicated by assessment  - Record patient progress and toleration of activity level on Mobility SBAR; progress patient to next Phase/Stage  - Instruct patient to call for assistance with activity based on assessment  - Consider rehabilitation consult to assist with strengthening/weightbearing, etc   Outcome: Progressing     Problem: Knowledge Deficit  Goal: Patient/family/caregiver demonstrates understanding of disease process, treatment plan, medications, and discharge instructions  Description: Complete learning assessment and assess knowledge base    Interventions:  - Provide teaching at level of understanding  - Provide teaching via preferred learning methods  Outcome: Progressing

## 2021-06-01 NOTE — CASE MANAGEMENT
Chart reviewed aware of medical management  Case was discussed in multidisciplinary discharge meeting  Present in meeting were: Hospitalist, PT, ChargeNurse, SURJIT  Clinical information supporting hospitalization:   + stroke  MRI is pending  - therapy evaluation is pending  Barriers to discharge:  - medical management  Discharge plan: home    CM will continue to follow plan of care and follow up post therapy evaluation for any additional needs

## 2021-06-01 NOTE — PHYSICAL THERAPY NOTE
Physical Therapy Screen    Patient Name: Anayeli KIRKLAND Date: 6/1/2021     Problem List  Principal Problem:    CVA (cerebral vascular accident) [facility])  Active Problems:    Essential hypertension    Hyperlipidemia       Past Medical History  Past Medical History:   Diagnosis Date    History of high cholesterol     Hypertension         Past Surgical History  Past Surgical History:   Procedure Laterality Date    HERNIA REPAIR           06/01/21 0955   PT Last Visit   PT Visit Date 06/01/21   Note Type   Note type Screen     Received order for PT consult  Chart reviewed  Pt admitted with CVA  CT brain Loss of gray-white differentiation left occipital lobe suspicious for recent infarct  MRI completed, but not resulted at time of screen  Spoke with RN, Saravanan Bush who reports patient is independent with mobility, but has some blurred vision in right eye  Otherwise, pt is able to complete functional mobility without assistance  Spoke with patient  Patient reports he has been ambulating in room prn  Reports he is independent PTA without AD, retired and + driving  Reports he has some vision changes in right eye, but right sided weakness has resolved at this time  Pt reports when reading, he needs to turn his head to the right to continue to read the complete paper, that the words are blurred  Pt does demonstrate peripheral vision to right with therapist standing in patient's right periphery  Pt demonstrates peripheral vision with right and left eye, however it is decreased compared to left  Reports vision changes are improving compared to admission  Pt was observed ambulating in room, navigating room without difficulty  Pt concerned about driving  Discussed Fit to Drive program with patient  Verbalized understanding  Pt without acute care skilled PT services warranted at this time  He is aware of his blurred vision to right and is turning head to compensate   D/C PT services at this time with patient independent, compensating for blurred vision to right without cues or difficulty  Should patient's status change, please re-consult       Vishnu Mark, PT,DPT

## 2021-06-01 NOTE — UTILIZATION REVIEW
Initial Clinical Review    Admission: Date/Time/Statement:   Admission Orders (From admission, onward)     Ordered        05/31/21 1045  Inpatient Admission  Once                   Orders Placed This Encounter   Procedures    Inpatient Admission     Standing Status:   Standing     Number of Occurrences:   1     Order Specific Question:   Level of Care     Answer:   Med Surg [16]     Order Specific Question:   Estimated length of stay     Answer:   More than 2 Midnights     Order Specific Question:   Certification     Answer:   I certify that inpatient services are medically necessary for this patient for a duration of greater than two midnights  See H&P and MD Progress Notes for additional information about the patient's course of treatment  ED Arrival Information     Expected Arrival Acuity Means of Arrival Escorted By Service Admission Type    - 5/31/2021 09:45 [facility] Hospitalist Emergency    Arrival Complaint    right side numbness & vision change        Chief Complaint   Patient presents with    Decreased Visual Acuity     pt noticed rightarm and righted numbness @ 10pm lastnight  shortly thereafter noticed black spots in his vision that are still there  Numbness is gone since midnight  Initial Presentation: 56year old male to the ED from home with complaints of right arm numbness, seeing black spots for about 12 hours prior to arrival  Admitted to inpatient for CVC, hypertension  RIght sided numbness resolved after and hour, blured vision continues  Stroke alert on arrival to the ED   NIHSS 1   CT head shows: Loss of gray-white differentiation left occipital lobe suspicious for recent infarct    TPA not given   CHeck MRI brain  Loaded with po plavix  GCS 15  Check ECHO, MRI  Date:   6/1  Day 2:  MRI of brain shows: Acute infarct left occipital region in the distribution of the left posterior cerebral artery  ECHO shows no thrombus or PFO listed  BP improving   As per speech eval:  Recommend regular diet and thin liquids  6/2 Neurology consult: Crytogenic stroke, left PCA  ECHO with EF 60%  Continue dual antiplatelet therapy  BP under better control     ED Triage Vitals [05/31/21 0951]   Temperature Pulse Respirations Blood Pressure SpO2   97 6 °F (36 4 °C) 84 18 (!) 192/113 97 %      Temp Source Heart Rate Source Patient Position - Orthostatic VS BP Location FiO2 (%)   Temporal Monitor Lying Left arm --      Pain Score       No Pain          Wt Readings from Last 1 Encounters:   05/31/21 90 3 kg (199 lb 1 2 oz)     Additional Vital Signs:   Date/Time  Temp  Pulse  Resp  BP  MAP (mmHg)  SpO2  O2 Device  Patient Position - Orthostatic VS   06/02/21 11:19:39  --  61  --  121/81  94  95 %  --  --   06/02/21 0730  98 3 °F (36 8 °C)  63  20  115/79  91  --  None (Room air)  --   06/02/21 07:23:19  98 3 °F (36 8 °C)  64  20  115/79  91  98 %  --  --   06/02/21 03:24:30  98 2 °F (36 8 °C)  64  16  112/73  86  97 %  None (Room air)  --   06/01/21 23:11:46  98 3 °F (36 8 °C)  63  15  114/74  87             Date/Time  Temp Pulse Resp  BP  MAP (mmHg)  SpO2  O2 Device  Patient Position - Orthostatic VS   06/01/21 0730  -- 66 18  144/104Abnormal   117  --  --  --   06/01/21 0630  98 2 °F (36 8 °C) 61 19  144/104Abnormal   117  94 %  --  Lying   06/01/21 0330  97 7 °F (36 5 °C) 71 18  148/103Abnormal   118  --  --  Lying   05/31/21 2329  97 8 °F (36 6 °C) 71 18  156/107Abnormal   123  --  None (Room air)  Lying   05/31/21 2130  98 1 °F (36 7 °C) 65 18  158/107Abnormal   124  94 %  None (Room air)  Lying   05/31/21 19:28:43  98 2 °F (36 8 °C) 80 --  175/114Abnormal   134  93 %  None (Room air)  --   05/31/21 1730  -- -- --  166/100  134  --  --  --   05/31/21 17:24:29  -- 90 18  177/113Abnormal   134  93 %  --  --   05/31/21 1530  98 4 °F (36 9 °C) 77 18  148/79  --  --  --  Sitting   05/31/21 14:46:50  98 4 °F (36 9 °C) 77 --  154/99  117  94 %  --  --   05/31/21 14:27:02  98 4 °F (36 9 °C) 81 18  160/100  120  94 %  --  --   05/31/21 13:30:31  98 °F (36 7 °C) 69 19  177/115Abnormal   136  95 %  --  --   05/31/21 1238  -- -- --  190/110Abnormal   --  --  --  --   05/31/21 12:30:40  98 °F (36 7 °C) 71 20  190/120Abnormal   143  95 %  --  Sitting   05/31/21 1222  -- 63 --  171/103Abnormal   --  94 %  None (Room air)  Lying   05/31/21 1215  -- 67 20  168/107Abnormal   --  96 %  --  --   05/31/21 1200  -- 64 20  166/106Abnormal   --  96 %  --  --   05/31/21 1145  -- 66 22  162/102Abnormal   --  95 %  --  --   05/31/21 1130  -- 63 23Abnormal   167/108Abnormal   --  94 %  --  --   05/31/21 1115  -- 64 17  159/102Abnormal   --  93 %  --  --   05/31/21 11:10:01  -- 60 13  --  --  94 %  --  --   05/31/21 1100  -- 64 15  164/109Abnormal   --  94 %  --  --   05/31/21 1045  -- 68 21  --  --  95 %  --  --   05/31/21 1030  -- 80 21  --  --  95 %  --  --   05/31/21 1025  -- 79 20  --  --  96 %  --  --   05/31/21 1023  -- -- --  192/104Abnormal   --  --  --  --   05/31/21 1015  -- 79 22  187/113Abnormal   --  96 %  --  --   05/31/21 1000  -- 82 23Abnormal   182/113Abnormal   --  95 %  --  --   05/31/21 0951  97 6 °F (36 4 °C) 84 18  192/113Abnormal   --  97 %  None (Room air)         Pertinent Labs/Diagnostic Test Results:   5/31 EKG: Normal sinus rhythm  Normal ECG  No previous ECGs available  5/31 MRI brain: Acute infarct left occipital region in the distribution of the left posterior cerebral artery   No acute hemorrhage seen   5/31 CT head: Acute infarct left occipital region in the distribution of the left posterior cerebral artery     No acute hemorrhage seen   5/31 CTA head/neck: Mild smooth left ICA stenosis secondary to atheromatous plaque   Stenosis is measured at less than 50%  6/1 ECHO: SUMMARY     LEFT VENTRICLE:  Size was normal   Systolic function was by EF (biplane method of disks)  Ejection fraction was estimated to be 60 %  There were no regional wall motion abnormalities    Wall thickness was mildly to moderately increased  Doppler parameters were consistent with abnormal left ventricular relaxation (grade 1 diastolic dysfunction)      MITRAL VALVE:  There was trace regurgitation      TRICUSPID VALVE:  There was mild regurgitation    Pulmonary artery systolic pressure was within the normal range      PULMONIC VALVE:  There was trace regurgitation      PERICARDIUM:  The pericardium was normal in appearance      HISTORY: PRIOR HISTORY: cerebrovascular accident, hypertension, hyperlipidemia, decreased visual acuity      Results from last 7 days   Lab Units 05/31/21  1005   SARS-COV-2  Negative     Results from last 7 days   Lab Units 06/01/21  0535 05/31/21  1005   WBC Thousand/uL 6 81 7 78   HEMOGLOBIN g/dL 13 9 14 6   HEMATOCRIT % 41 5 43 3   PLATELETS Thousands/uL 197 215   NEUTROS ABS Thousands/µL 4 37  --          Results from last 7 days   Lab Units 06/01/21  0535 05/31/21  1005   SODIUM mmol/L 138 135*   POTASSIUM mmol/L 3 8 4 7   CHLORIDE mmol/L 103 101   CO2 mmol/L 24 26   ANION GAP mmol/L 11 8   BUN mg/dL 19 20   CREATININE mg/dL 1 14 0 98   EGFR ml/min/1 73sq m 69 82   CALCIUM mg/dL 8 7 9 1   MAGNESIUM mg/dL 2 1  --    PHOSPHORUS mg/dL 3 6  --      Results from last 7 days   Lab Units 06/01/21  0535   AST U/L 32   ALT U/L 54   ALK PHOS U/L 79   TOTAL PROTEIN g/dL 7 3   ALBUMIN g/dL 3 5   TOTAL BILIRUBIN mg/dL 0 62     Results from last 7 days   Lab Units 05/31/21  0957   POC GLUCOSE mg/dl 79     Results from last 7 days   Lab Units 06/01/21  0535 05/31/21  1005   GLUCOSE RANDOM mg/dL 114 113         Results from last 7 days   Lab Units 06/01/21  0535   HEMOGLOBIN A1C % 5 9*   EAG mg/dl 123       Results from last 7 days   Lab Units 05/31/21  1005   TROPONIN I ng/mL <0 02         Results from last 7 days   Lab Units 05/31/21  1005   PROTIME seconds 12 5   INR  0 95   PTT seconds 30     ED Treatment:   Medication Administration from 05/31/2021 0943 to 05/31/2021 1226       Date/Time Order Dose Route Action     05/31/2021 1039 metoprolol (LOPRESSOR) injection 5 mg 5 mg Intravenous Given     05/31/2021 1038 clopidogrel (PLAVIX) tablet 300 mg 300 mg Oral Given        Past Medical History:   Diagnosis Date    History of high cholesterol     Hypertension      Admitting Diagnosis: Visual disturbance [H53 9]  Hypertension [I10]  CVA (cerebral vascular accident) ([facility] [address] ) [I63 9]  Decreased visual acuity [H54 7]  Age/Sex: 56 y o  male  Admission Orders:  Neuro checks: Every 1 hour x 4 hours, then every 2 hours x 8 hours, then every 4 hours x 72 hours  TEle  SCDs  NIHSS every 24 hours  ECHO  Scheduled Medications:  aspirin, 81 mg, Oral, Daily  atorvastatin, 40 mg, Oral, Daily With Dinner  enoxaparin, 40 mg, Subcutaneous, Daily      Continuous IV Infusions:     PRN Meds:       IP CONSULT TO NEUROLOGY  IP CONSULT TO NEUROLOGY  IP CONSULT TO CASE MANAGEMENT  IP CONSULT TO NUTRITION SERVICES    Network Utilization Review Department  ATTENTION: Please call with any questions or concerns to [phone] and carefully listen to the prompts so that you are directed to the right person  All voicemails are confidential   Meseret Wheatley all requests for admission clinical reviews, approved or denied determinations and any other requests to dedicated fax number below belonging to the campus where the patient is receiving treatment   List of dedicated fax numbers for the Facilities:  [address] DENIALS (Administrative/Medical Necessity) [phone]   [address] (Maternity/NICU/Pediatrics) [phone]   [address] [place] [place] [id number] [facility] [place] Dominique 6878 [id number] [address] 033 [facility] [phone]   [place] [address] [phone]

## 2021-06-01 NOTE — PLAN OF CARE
Problem: NEUROSENSORY - ADULT  Goal: Achieves stable or improved neurological status  Description: INTERVENTIONS  - Monitor and report changes in neurological status  - Monitor vital signs such as temperature, blood pressure, glucose, and any other labs ordered   - Initiate measures to prevent increased intracranial pressure  - Monitor for seizure activity and implement precautions if appropriate      Outcome: Progressing  Goal: Remains free of injury related to seizures activity  Description: INTERVENTIONS  - Maintain airway, patient safety  and administer oxygen as ordered  - Monitor patient for seizure activity, document and report duration and description of seizure to physician/advanced practitioner  - If seizure occurs,  ensure patient safety during seizure  - Reorient patient post seizure  - Seizure pads on all 4 side rails  - Instruct patient/family to notify RN of any seizure activity including if an aura is experienced  - Instruct patient/family to call for assistance with activity based on nursing assessment  - Administer anti-seizure medications if ordered    Outcome: Progressing  Goal: Achieves maximal functionality and self care  Description: INTERVENTIONS  - Monitor swallowing and airway patency with patient fatigue and changes in neurological status  - Encourage and assist patient to increase activity and self care     - Encourage visually impaired, hearing impaired and aphasic patients to use assistive/communication devices  Outcome: Progressing     Problem: CARDIOVASCULAR - ADULT  Goal: Maintains optimal cardiac output and hemodynamic stability  Description: INTERVENTIONS:  - Monitor I/O, vital signs and rhythm  - Monitor for S/S and trends of decreased cardiac output  - Administer and titrate ordered vasoactive medications to optimize hemodynamic stability  - Assess quality of pulses, skin color and temperature  - Assess for signs of decreased coronary artery perfusion  - Instruct patient to report change in severity of symptoms  Outcome: Progressing  Goal: Absence of cardiac dysrhythmias or at baseline rhythm  Description: INTERVENTIONS:  - Continuous cardiac monitoring, vital signs, obtain 12 lead EKG if ordered  - Administer antiarrhythmic and heart rate control medications as ordered  - Monitor electrolytes and administer replacement therapy as ordered  Outcome: Progressing     Problem: HEMATOLOGIC - ADULT  Goal: Maintains hematologic stability  Description: INTERVENTIONS  - Assess for signs and symptoms of bleeding or hemorrhage  - Monitor labs  - Administer supportive blood products/factors as ordered and appropriate  Outcome: Progressing     Problem: SAFETY ADULT  Goal: Patient will remain free of falls  Description: INTERVENTIONS:  - Assess patient frequently for physical needs  -  Identify cognitive and physical deficits and behaviors that affect risk of falls    -  [place] fall precautions as indicated by assessment   - Educate patient/family on patient safety including physical limitations  - Instruct patient to call for assistance with activity based on assessment  - Modify environment to reduce risk of injury  - Consider OT/PT consult to assist with strengthening/mobility  Outcome: Progressing  Goal: Maintain or return to baseline ADL function  Description: INTERVENTIONS:  -  Assess patient's ability to carry out ADLs; assess patient's baseline for ADL function and identify physical deficits which impact ability to perform ADLs (bathing, care of mouth/teeth, toileting, grooming, dressing, etc )  - Assess/evaluate cause of self-care deficits   - Assess range of motion  - Assess patient's mobility; develop plan if impaired  - Assess patient's need for assistive devices and provide as appropriate  - Encourage maximum independence but intervene and supervise when necessary  - Involve family in performance of ADLs  - Assess for home care needs following discharge   - Consider OT consult to assist with ADL evaluation and planning for discharge  - Provide patient education as appropriate  Outcome: Progressing  Goal: Maintain or return mobility status to optimal level  Description: INTERVENTIONS:  - Assess patient's baseline mobility status (ambulation, transfers, stairs, etc )    - Identify cognitive and physical deficits and behaviors that affect mobility  - Identify mobility aids required to assist with transfers and/or ambulation (gait belt, sit-to-stand, lift, walker, cane, etc )  - [place] fall precautions as indicated by assessment  - Record patient progress and toleration of activity level on Mobility SBAR; progress patient to next Phase/Stage  - Instruct patient to call for assistance with activity based on assessment  - Consider rehabilitation consult to assist with strengthening/weightbearing, etc   Outcome: Progressing     Problem: Knowledge Deficit  Goal: Patient/family/caregiver demonstrates understanding of disease process, treatment plan, medications, and discharge instructions  Description: Complete learning assessment and assess knowledge base    Interventions:  - Provide teaching at level of understanding  - Provide teaching via preferred learning methods  Outcome: Progressing

## 2021-06-01 NOTE — SPEECH THERAPY NOTE
Speech-Language Pathology Bedside Swallow Evaluation    Patient Name: Fazal Marte    WTPQS'V Date: 6/1/2021     Problem List  Principal Problem:    CVA (cerebral vascular accident) [facility])  Active Problems:    Essential hypertension    Hyperlipidemia      Past Medical History  Past Medical History:   Diagnosis Date    History of high cholesterol     Hypertension        Past Surgical History  Past Surgical History:   Procedure Laterality Date    HERNIA REPAIR         Summary  Pt presented with functional appearing oral and pharyngeal stage swallowing skills with materials administered today  He did appear to have slight tongue deviation to the R upon protrusion, however no impact on speech/swallowing  No s/s aspiration noted with thin liquids or regular solids  No additional ST f/u needed at this time, please re consult with any new changes or concerns  Risk/s for Aspiration: suspect low    Recommended Diet: regular diet and thin liquids   Recommended Form of Meds: whole with liquid   Aspiration precautions and swallowing strategies: upright posture  Other Recommendations: Continue frequent oral care      Current Medical Status per H&P 5/31/21  Fazal Marte is a 56 y o  male who presents with blurring of vision in right eye that had started at about the same time as right upper and lower extremity weakness - 9 - 10 pm on the night prior to admission  The patient stated that he just went to bed despite his symptoms  When he woke up this am, the right upper and lower extremity weakness resolved however the blurring of vision in his right eye persisted prompting him to come to the ED  He endorses that he sees floaters and has some blurring of peripheral vision  Denies any other symptoms such as palpitations, lightheadedness, difficulty swallowing, difficulty ambulating  He does endorse some mild headache that seems to be related to his sinus congestion     Of note, the patient endorses a similar event in the past which resolved spontaneously  Special Studies:  MRI 6/1/21 IMPRESSION:  Acute infarct left occipital region in the distribution of the left posterior cerebral artery  No acute hemorrhage seen    Social/Education/Vocational Hx:  Pt lives alone    Swallow Information   Current Risks for Dysphagia & Aspiration: CVA  Current Diet: regular diet and thin liquids   Baseline Diet: regular diet and thin liquids      Baseline Assessment   Behavior/Cognition: alert  Speech/Language Status: able to participate in conversation and able to follow commands  Patient Positioning: upright in bed  Pain Status/Interventions/Response to Interventions: No report of or nonverbal indications of pain  Swallow Mechanism Exam  Facial: symmetrical  Labial: WFL  Lingual: possible slight lingual deviation to the R upon protrusion, no impact on speech/swallowing  Velum: symmetrical  Mandible: adequate ROM  Dentition: adequate  Vocal quality:clear/adequate   Volitional Cough: strong/productive   Respiratory Status: on RA     Consistencies Assessed and Performance   Consistencies Administered: thin liquids, puree and soft solids  Materials administered included Lunch tray consisting of soda, mashed potatoes, cooked veggies and sliced beef    Oral Stage: WFL  Mastication was adequate with the materials administered today  Bolus formation and transfer were functional with no significant oral residue noted  No overt s/s reduced oral control  Pharyngeal Stage: WFL  Swallow Mechanics: Swallowing initiation appeared prompt  Laryngeal rise was palpated and judged to be within functional limits  No coughing, throat clearing, change in vocal quality or respiratory status noted today  Esophageal Concerns: none reported    Summary and Recommendations (see above)    Results Reviewed with: patient     Treatment Recommended: No additional ST f/u needed at this time

## 2021-06-01 NOTE — CASE MANAGEMENT
LOS DAY1, URR 8 GREEN, NOT A 30 DAY READMIT, NOT A MEADICARE BUNDLE  PT was admitted to the hospital for visual disturbances  Evaluated the pt at the bedside  Pt states he lives alone in a mobile home  Pt indicated he is completely independent and drives  PT PCP is Dr Anusha Lindsey  Pt uses [employer]  Pt has no DME  Pt states his dtr will transport him home upon discharge  Pt has an impending neurology consult  Do not anticipate any discharge needs  Patient/caregiver received discharge checklist   Content reviewed  Patient/caregiver encouraged to participate in discharge plan of care prior to discharge home  CM reviewed d/c planning process including the following: identifying help at home, patient preference for d/c planning needs, availability of treatment team to discuss questions or concerns patient and/or family may have regarding understanding medications and recognizing signs and symptoms once discharged  CM also encouraged patient to follow up with all recommended appointments after discharge  Patient advised of importance for patient and family to participate in managing patients medical well being

## 2021-06-02 VITALS
DIASTOLIC BLOOD PRESSURE: 81 MMHG | BODY MASS INDEX: 29.49 KG/M2 | HEART RATE: 61 BPM | OXYGEN SATURATION: 95 % | SYSTOLIC BLOOD PRESSURE: 121 MMHG | TEMPERATURE: 98.3 F | WEIGHT: 199.08 LBS | HEIGHT: 69 IN | RESPIRATION RATE: 20 BRPM

## 2021-06-02 PROCEDURE — 99255 IP/OBS CONSLTJ NEW/EST HI 80: CPT | Performed by: PSYCHIATRY & NEUROLOGY

## 2021-06-02 PROCEDURE — 99239 HOSP IP/OBS DSCHRG MGMT >30: CPT | Performed by: FAMILY MEDICINE

## 2021-06-02 RX ORDER — CLOPIDOGREL BISULFATE 75 MG/1
75 TABLET ORAL DAILY
Qty: 30 TABLET | Refills: 2 | Status: SHIPPED | OUTPATIENT
Start: 2021-06-03 | End: 2021-08-15

## 2021-06-02 RX ORDER — ASPIRIN 81 MG/1
81 TABLET, CHEWABLE ORAL DAILY
Refills: 0
Start: 2021-06-02 | End: 2021-08-15

## 2021-06-02 RX ORDER — ATORVASTATIN CALCIUM 40 MG/1
40 TABLET, FILM COATED ORAL
Qty: 30 TABLET | Refills: 0 | Status: SHIPPED | OUTPATIENT
Start: 2021-06-02

## 2021-06-02 RX ORDER — LISINOPRIL 10 MG/1
10 TABLET ORAL DAILY
Qty: 30 TABLET | Refills: 0 | Status: SHIPPED | OUTPATIENT
Start: 2021-06-02

## 2021-06-02 RX ADMIN — ASPIRIN 81 MG: 81 TABLET, CHEWABLE ORAL at 08:29

## 2021-06-02 RX ADMIN — LISINOPRIL 20 MG: 20 TABLET ORAL at 08:29

## 2021-06-02 RX ADMIN — CLOPIDOGREL BISULFATE 75 MG: 75 TABLET ORAL at 08:29

## 2021-06-02 NOTE — TELEMEDICINE
TeleConsultation - Neurology   Glo Daley 56 y o  male MRN: [id number]  Unit/Bed#: -01 Encounter: [id number]      REQUIRED DOCUMENTATION:     1  This service was provided via Telemedicine  2  Provider located at [facility]  3  TeleMed provider: Celena Rodriguez DO   4  Identify all parties in room with patient during tele consult:  Patient only  5  After connecting through televideo, patient was identified by name and date of birth and assistant checked wristband  Patient was then informed that this was a Telemedicine visit and that the exam was being conducted confidentially over secure lines  My office door was closed  No one else was in the room  Patient acknowledged consent and understanding of privacy and security of the Telemedicine visit, and gave us permission to have the assistant stay in the room in order to assist with the history and to conduct the exam   I informed the patient that I have reviewed their record in Epic and presented the opportunity for them to ask any questions regarding the visit today  The patient agreed to participate  Assessment/Plan     1) Cryptogenic stroke,  Left PCA  -   Pattern would be consistent with embolic phenomenon however does not appear to be any clear source  -CTA head and neck was unremarkable for any hemodynamically significant stenosis  -Echocardiogram demonstrated EF 60% with no gross dyskinesis or atrial enlargement  -A1c was 5 9   -FLP was 192/160/56/104    -  Recommend continuing dual antiplatelet therapy for 21 days then discontinuing aspirin and continuing Plavix 75 mg daily monotherapy     -Changed patient's home Crestor to Lipitor 40 mg  If cost is an issue, okay to increase home dose of Lipitor     - given cryptogenic nature and pattern which would be consistent with embolic phenomenon, recommend outpatient loop recorder placement   With cardiology    -   Otherwise, no additional neurologic recommendations at this time     -Office will call patient to arrange outpatient follow-up     -Please call questions /concerns  Dalia Alva will need follow up in in 3 months with neurovascular attending or advance practitioner  He will not require outpatient neurological testing  History of Present Illness     Reason for Consult / Principal Problem:   Stroke  Hx and PE limited by:  Not applicable  HPI: Dalia Alva is a 68-year-old man  presenting with  Right arm and leg numbness and blurry vision  Patient reports symptoms started approximately 2200  The evening prior to  admission  Patient notes that the sensory symptoms have resolved however still reports   Some blurry vision   -    emergency room physicianreports possible slight right facial weakness on exam, otherwise no focal deficits   - NIHSS   One, right facial weakness  - LKW  2200 yesterday evening   patient did not receive tPA          Inpatient consult to Neurology  Consult performed by: Creola Bamberger, DO  Consult ordered by: Elizabeth Lee MD           Review of Systems   Constitutional: Negative  HENT: Negative for hearing loss  Eyes: Negative for photophobia and visual disturbance  Respiratory: Negative for wheezing  Cardiovascular: Negative for chest pain and palpitations  Genitourinary: Negative for dysuria and urgency  Neurological: Negative for dizziness, weakness, light-headedness, numbness and headaches  All other systems reviewed and are negative        Historical Information   Past Medical History:   Diagnosis Date    History of high cholesterol     Hypertension      Past Surgical History:   Procedure Laterality Date    HERNIA REPAIR       Social History   Social History     Substance and Sexual Activity   Alcohol Use Yes    Frequency: 4 or more times a week    Drinks per session: 3 or 4    Binge frequency: Never     Social History     Substance and Sexual Activity   Drug Use Never     E-Cigarette/Vaping E-Cigarette/Vaping Substances     Social History     Tobacco Use   Smoking Status Never Smoker   Smokeless Tobacco Never Used     Family History: History reviewed  No pertinent family history  Review of previous medical records was  completed  Meds/Allergies   current meds:   Current Facility-Administered Medications   Medication Dose Route Frequency    aspirin chewable tablet 81 mg  81 mg Oral Daily    atorvastatin (LIPITOR) tablet 40 mg  40 mg Oral Daily With Dinner    clopidogrel (PLAVIX) tablet 75 mg  75 mg Oral Daily    lisinopril (ZESTRIL) tablet 20 mg  20 mg Oral Daily       No Known Allergies    Objective   Vitals:Blood pressure 121/81, pulse 61, temperature 98 3 °F (36 8 °C), resp  rate 20, height 5' 9" (1 753 m), weight 90 3 kg (199 lb 1 2 oz), SpO2 95 %  ,Body mass index is 29 4 kg/m²  Intake/Output Summary (Last 24 hours) at 6/2/2021 1335  Last data filed at 6/2/2021 0852  Gross per 24 hour   Intake 940 ml   Output --   Net 940 ml       Invasive Devices: Invasive Devices     Peripheral Intravenous Line            Peripheral IV 05/31/21 Left Antecubital 2 days    Peripheral IV 05/31/21 Right Antecubital 2 days                Physical Exam  Vitals signs reviewed  Constitutional:       General: He is not in acute distress  Appearance: Normal appearance  He is well-developed and normal weight  He is not ill-appearing, toxic-appearing or diaphoretic  HENT:      Head: Normocephalic and atraumatic  Eyes:      General:         Right eye: No discharge  Left eye: No discharge  Extraocular Movements: Extraocular movements intact  Conjunctiva/sclera: Conjunctivae normal    Cardiovascular:      Rate and Rhythm: Normal rate  Pulmonary:      Effort: Pulmonary effort is normal  No respiratory distress  Musculoskeletal: Normal range of motion  Neurological:      General: No focal deficit present  Mental Status: He is alert        Cranial Nerves: No cranial nerve deficit  Sensory: No sensory deficit  Motor: No weakness  Coordination: Coordination normal    Psychiatric:         Mood and Affect: Mood normal          Behavior: Behavior normal        Neurologic Exam     Mental Status   Awake alert oriented x3  Attention appears normal and intact  Language is fluent, comprehension appears to be intact  No gross evidence of aphasia  Patient is interacting appropriately  Cranial Nerves   Pupils are equal and round  Extraocular muscles intact, gaze conjugate  slightly widened palpebral fissure and flattened nasolabial fold on the right  (Subtle)  Shoulder shrug is symmetric  Motor Exam Patient moves all 4 extremities equally without drift  Bulk appears normal     Sensory Exam   Patient acknowledges sensation equally in all 4 extremities     Gait, Coordination, and Reflexes  no gross ataxia in any limb  Finger-to-nose and heel-to-shin were intact  Gait was deferred       Lab Results: I have personally reviewed pertinent reports  Imaging Studies: I have personally reviewed pertinent films in PACS  EKG, Pathology, and Other Studies: I have personally reviewed pertinent reports      VTE Prophylaxis: Sequential compression device (Venodyne)

## 2021-06-02 NOTE — CASE MANAGEMENT
Discussed in huddle with nursing, MD, CM   + stroke, pending Neurology consult,  MD indicated patient will need follow up post dc with Vascular Surgeon in [place] is his preference- Vascular MD information placed on AVS   Current dc plan is home

## 2021-06-02 NOTE — PLAN OF CARE
Problem: NEUROSENSORY - ADULT  Goal: Achieves stable or improved neurological status  Description: INTERVENTIONS  - Monitor and report changes in neurological status  - Monitor vital signs such as temperature, blood pressure, glucose, and any other labs ordered   - Initiate measures to prevent increased intracranial pressure  - Monitor for seizure activity and implement precautions if appropriate      Outcome: Progressing  Goal: Remains free of injury related to seizures activity  Description: INTERVENTIONS  - Maintain airway, patient safety  and administer oxygen as ordered  - Monitor patient for seizure activity, document and report duration and description of seizure to physician/advanced practitioner  - If seizure occurs,  ensure patient safety during seizure  - Reorient patient post seizure  - Seizure pads on all 4 side rails  - Instruct patient/family to notify RN of any seizure activity including if an aura is experienced  - Instruct patient/family to call for assistance with activity based on nursing assessment  - Administer anti-seizure medications if ordered    Outcome: Progressing  Goal: Achieves maximal functionality and self care  Description: INTERVENTIONS  - Monitor swallowing and airway patency with patient fatigue and changes in neurological status  - Encourage and assist patient to increase activity and self care     - Encourage visually impaired, hearing impaired and aphasic patients to use assistive/communication devices  Outcome: Progressing     Problem: CARDIOVASCULAR - ADULT  Goal: Maintains optimal cardiac output and hemodynamic stability  Description: INTERVENTIONS:  - Monitor I/O, vital signs and rhythm  - Monitor for S/S and trends of decreased cardiac output  - Administer and titrate ordered vasoactive medications to optimize hemodynamic stability  - Assess quality of pulses, skin color and temperature  - Assess for signs of decreased coronary artery perfusion  - Instruct patient to report change in severity of symptoms  Outcome: Progressing  Goal: Absence of cardiac dysrhythmias or at baseline rhythm  Description: INTERVENTIONS:  - Continuous cardiac monitoring, vital signs, obtain 12 lead EKG if ordered  - Administer antiarrhythmic and heart rate control medications as ordered  - Monitor electrolytes and administer replacement therapy as ordered  Outcome: Progressing     Problem: HEMATOLOGIC - ADULT  Goal: Maintains hematologic stability  Description: INTERVENTIONS  - Assess for signs and symptoms of bleeding or hemorrhage  - Monitor labs  - Administer supportive blood products/factors as ordered and appropriate  Outcome: Progressing     Problem: SAFETY ADULT  Goal: Patient will remain free of falls  Description: INTERVENTIONS:  - Assess patient frequently for physical needs  -  Identify cognitive and physical deficits and behaviors that affect risk of falls    -  [place] fall precautions as indicated by assessment   - Educate patient/family on patient safety including physical limitations  - Instruct patient to call for assistance with activity based on assessment  - Modify environment to reduce risk of injury  - Consider OT/PT consult to assist with strengthening/mobility  Outcome: Progressing  Goal: Maintain or return to baseline ADL function  Description: INTERVENTIONS:  -  Assess patient's ability to carry out ADLs; assess patient's baseline for ADL function and identify physical deficits which impact ability to perform ADLs (bathing, care of mouth/teeth, toileting, grooming, dressing, etc )  - Assess/evaluate cause of self-care deficits   - Assess range of motion  - Assess patient's mobility; develop plan if impaired  - Assess patient's need for assistive devices and provide as appropriate  - Encourage maximum independence but intervene and supervise when necessary  - Involve family in performance of ADLs  - Assess for home care needs following discharge   - Consider OT consult to assist with ADL evaluation and planning for discharge  - Provide patient education as appropriate  Outcome: Progressing  Goal: Maintain or return mobility status to optimal level  Description: INTERVENTIONS:  - Assess patient's baseline mobility status (ambulation, transfers, stairs, etc )    - Identify cognitive and physical deficits and behaviors that affect mobility  - Identify mobility aids required to assist with transfers and/or ambulation (gait belt, sit-to-stand, lift, walker, cane, etc )  - [place] fall precautions as indicated by assessment  - Record patient progress and toleration of activity level on Mobility SBAR; progress patient to next Phase/Stage  - Instruct patient to call for assistance with activity based on assessment  - Consider rehabilitation consult to assist with strengthening/weightbearing, etc   Outcome: Progressing     Problem: Knowledge Deficit  Goal: Patient/family/caregiver demonstrates understanding of disease process, treatment plan, medications, and discharge instructions  Description: Complete learning assessment and assess knowledge base    Interventions:  - Provide teaching at level of understanding  - Provide teaching via preferred learning methods  Outcome: Progressing

## 2021-06-02 NOTE — DISCHARGE SUMMARY
[address]  Discharge- Kim Yanes 1960, 56 y o  male MRN: [id number]  Unit/Bed#: -01 Encounter: [id number]  Primary Care Provider: Bharti Dickens DO   Date and time admitted to hospital: 5/31/2021  9:49 AM    Prediabetes  Assessment & Plan  · Hemoglobin A1c is 5 9 needs the outpatient diet control    Hyperlipidemia  Assessment & Plan  Continue aspirin and atorvastatin  Follow-up lipid panel uncontrolled LDL    Essential hypertension  Assessment & Plan  Patient had not been following with a primary care provider for 10 years up until recently (around March)  Controlled will actually discharged on lisinopril 10 mg daily    * CVA (cerebral vascular accident) [facility])  Assessment & Plan  · Patient presenting with blurring of vision in his right eye which was initially accompanied by weakness of his right upper and lower extremities the numbness and weakness has resolved his right eye blurry vision is improving  · Patient had an MRI of the brain done- Acute infarct left occipital region in the distribution of the left posterior cerebral artery  · Discussed with Neurology today he will continue Plavix and aspirin for 21 days and then switch to Plavix 75 mg daily continue Lipitor 40 mg daily  · Atorvastatin 40 mg q h s  LDL is uncontrolled  · His 2D echo has no thrombus or any indication of a PFO listed  · Hemoglobin A1c is 5 9 in prediabetic range  Patient will need diet controlled  · No AFib on telemetry  · CTA of the head and neck vessels- Mild smooth left ICA stenosis secondary to atheromatous plaque  Stenosis is measured at less than 50%    No significant stenosis but explained he could establish care with vascular surgeon and can continue follow-up with them which I made a referral to the [facility]  · Blood pressure is controlled actually will discharge her lisinopril 10 mg daily  · Also as discussed with Neurology 100% cause not clear as this is cryptogenic pattern suspected embolic but no clear source he will need a loop recorder as an outpatient I did make a referral to Cardiology        Discharging Physician / Practitioner: Adan Love MD  PCP: Bharti Dickens DO  Admission Date:   Admission Orders (From admission, onward)     Ordered        05/31/21 1045  Inpatient Admission  Once                   Discharge Date: 06/02/21    Resolved Problems  Date Reviewed: 6/2/2021    None          Consultations During Hospital Stay:  · Neurology    Procedures Performed:   · None    Significant Findings / Test Results:   CTA head and neck- Mild smooth left ICA stenosis secondary to atheromatous plaque  Stenosis is measured at less than 50%   MRI of the brain- Acute infarct left occipital region in the distribution of the left posterior cerebral artery  ·  2D echo- there is no abnormality of a PFO stated EF 60%    Incidental Findings:   · None     Test Results Pending at Discharge (will require follow up): · None     Outpatient Tests Requested:  · Loop recorder    Complications:  None    Reason for Admission:  Blurry vision on the right side and right-sided paresthesias with weakness     Hospital Course:     Kim Yanes is a 56 y o  male patient who originally presented to the hospital on 5/31/2021 due to right blurry vision and right-sided paresthesias and weakness which has resolved blurry vision has improved a CT of the brain already revealed that it was a recent occipital stroke CTA had a a mild stenosis most likely less than 50% secondary to a plaque  He has been started on Plavix and aspirin Lipitor switched from Crestor    He has longstanding history of hypertension but he was not placed on any blood pressure medications initially permissive hypertension was done for at least 24 hours and then I started him on lisinopril which will be discharged home on 10 mg daily his blood pressure is controlled he is a prediabetic I discussed with him monitor diet his LDL is above 100 he will be switched to Lipitor 40 mg daily he needs to poor follow-up with neurovascular in 3 months although the stenosis is less than 50% there is and a plaque in there is a just follow-up with vascular surgery which I made recommendation of in [place]  As discussed with Neurology pattern seems embolic but there is no clear source as he has no AFib and 2D echo is normal he will need a loop recorder as an outpatient with ID discussed with patient and referral to Cardiology has been made but otherwise patient is medically clear to be discharged home he will remain on aspirin and Plavix for 21 days and then go to Plavix alone        Please see above list of diagnoses and related plan for additional information  Condition at Discharge: stable     Discharge Day Visit / Exam:     Subjective:  Patient seen and examined right eye blurry vision continues to improve  Vitals: Blood Pressure: 121/81 (06/02/21 1119)  Pulse: 61 (06/02/21 1119)  Temperature: 98 3 °F (36 8 °C) (06/02/21 0730)  Temp Source: Oral (06/01/21 1931)  Respirations: 20 (06/02/21 0730)  Height: 5' 9" (175 3 cm) (05/31/21 0951)  Weight - Scale: 90 3 kg (199 lb 1 2 oz) (05/31/21 0951)  SpO2: 95 % (06/02/21 1119)  Exam:   Physical Exam  Vitals signs and nursing note reviewed  Constitutional:       Appearance: He is well-developed  HENT:      Head: Normocephalic and atraumatic  Eyes:      Conjunctiva/sclera: Conjunctivae normal    Neck:      Musculoskeletal: Neck supple  Cardiovascular:      Rate and Rhythm: Normal rate and regular rhythm  Heart sounds: No murmur  Pulmonary:      Effort: Pulmonary effort is normal  No respiratory distress  Breath sounds: Normal breath sounds  No wheezing or rales  Abdominal:      Palpations: Abdomen is soft  Tenderness: There is no abdominal tenderness  Musculoskeletal:         General: No swelling  Skin:     General: Skin is warm and dry     Neurological:      General: No focal deficit present  Mental Status: He is alert and oriented to person, place, and time  Comments: Right side blurry vision   Psychiatric:         Mood and Affect: Mood normal          Discussion with Family:  Patient will update daughter    Discharge instructions/Information to patient and family:   See after visit summary for information provided to patient and family  Provisions for Follow-Up Care:  See after visit summary for information related to follow-up care and any pertinent home health orders  Disposition:     Home    For Discharges to [facility] SNF:   · Not Applicable to this Patient - Not Applicable to this Patient    Planned Readmission: no     Discharge Statement:  I spent >35 minutes discharging the patient  This time was spent on the day of discharge  I had direct contact with the patient on the day of discharge  Greater than 50% of the total time was spent examining patient, answering all patient questions, arranging and discussing plan of care with patient as well as directly providing post-discharge instructions  Additional time then spent on discharge activities  Discharge Medications:  See after visit summary for reconciled discharge medications provided to patient and family        ** Please Note: This note has been constructed using a voice recognition system **

## 2021-06-02 NOTE — UTILIZATION REVIEW
Inpatient Admission Authorization Request   NOTIFICATION OF INPATIENT ADMISSION/INPATIENT AUTHORIZATION REQUEST   SERVICING FACILITY:   [address] [place] [address]  Alexi Martinez [address] [place] [address], [address]  Tax ID: [id number]  NPI: [id number]  Place of Service: [facility]patient [facility]  [address]  Place of Service Code: 24     ATTENDING PROVIDER:  Attending Name and NPI#: Michi Omalley, [address] [[id number]]  Address: Alexi Martinez [address] [place] [address], [facility] [address]wood  Phone: [phone]     UTILIZATION REVIEW CONTACT:  [facility] Utilization   Network Utilization Review Department  Phone: [phone]  Fax [phone]  Email: [email or web address]     PHYSICIAN ADVISORY SERVICES:  FOR [id number] REVIEW - MEDICAL NECESSITY DENIAL  Phone: [phone]  Fax: [phone]  Email: [email or web address]     TYPE OF REQUEST:  Inpatient Status     ADMISSION INFORMATION:  ADMISSION DATE/TIME: 5/31/21 1045  PATIENT DIAGNOSIS CODE/DESCRIPTION:  Visual disturbance [H53 9]  Hypertension [I10]  CVA (cerebral vascular accident) ([address] ) [I63 9]  Decreased visual acuity [H54 7]  DISCHARGE DATE/TIME: No discharge date for patient encounter  DISCHARGE DISPOSITION (IF DISCHARGED): Final discharge disposition not confirmed     IMPORTANT INFORMATION:  Please contact the [facility] directly with any questions or concerns regarding this request  Department voicemails are confidential     Send requests for admission clinical reviews, concurrent reviews, approvals, and administrative denials due to lack of clinical to fax [phone]

## 2021-06-02 NOTE — ASSESSMENT & PLAN NOTE
Patient had not been following with a primary care provider for 10 years up until recently (around March)  Controlled will actually discharged on lisinopril 10 mg daily

## 2021-06-02 NOTE — ASSESSMENT & PLAN NOTE
· Patient presenting with blurring of vision in his right eye which was initially accompanied by weakness of his right upper and lower extremities the numbness and weakness has resolved his right eye blurry vision is improving  · Patient had an MRI of the brain done- Acute infarct left occipital region in the distribution of the left posterior cerebral artery  · Discussed with Neurology today he will continue Plavix and aspirin for 21 days and then switch to Plavix 75 mg daily continue Lipitor 40 mg daily  · Atorvastatin 40 mg q h s  LDL is uncontrolled  · His 2D echo has no thrombus or any indication of a PFO listed  · Hemoglobin A1c is 5 9 in prediabetic range  Patient will need diet controlled  · No AFib on telemetry  · CTA of the head and neck vessels- Mild smooth left ICA stenosis secondary to atheromatous plaque  Stenosis is measured at less than 50%    No significant stenosis but explained he could establish care with vascular surgeon and can continue follow-up with them which I made a referral to the [employer] [facility]  · Blood pressure is controlled actually will discharge her lisinopril 10 mg daily  · Also as discussed with Neurology 100% cause not clear as this is cryptogenic pattern suspected embolic but no clear source he will need a loop recorder as an outpatient I did make a referral to Cardiology

## 2021-06-03 NOTE — UTILIZATION REVIEW
Notification of Discharge   This is a Notification of Discharge from our facility [address]  Please be advised that this patient has been discharge from our facility  Below you will find the admission and discharge date and time including the patients disposition  UTILIZATION REVIEW CONTACT:  Thor Fletcher  Utilization   Network Utilization Review Department  Phone: [phone] x carefully listen to the prompts  All voicemails are confidential   Email: [email or web address]     PHYSICIAN ADVISORY SERVICES:  FOR [id number] REVIEW - MEDICAL NECESSITY DENIAL  Phone: [phone]  Fax: [phone]  Email: [email or web address]     PRESENTATION DATE: 5/31/2021  9:49 AM  OBERVATION ADMISSION DATE  INPATIENT ADMISSION DATE: 5/31/21 1045   DISCHARGE DATE: 6/2/2021  2:50 PM  DISPOSITION: Home/Self Care Home/Self Care      IMPORTANT INFORMATION:  Send all requests for admission clinical reviews, approved or denied determinations and any other requests to dedicated fax number below belonging to the campus where the patient is receiving treatment   List of dedicated fax numbers:  [address] DENIALS (Administrative/Medical Necessity) [phone]   [address] (Maternity/NICU/Pediatrics) [phone]   Campos Min [phone]   Amy Sheets [phone]   Flavia Ma [phone]   [facility] [address] [place] [address] [phone]   [facility]  [phone]   [address] [facility] [address]  [address] [facility] [address] [facility] [phone]

## 2021-06-05 NOTE — UTILIZATION REVIEW
Notification of Discharge   This is a Notification of Discharge from our facility [address]  Please be advised that this patient has been discharge from our facility  Below you will find the admission and discharge date and time including the patients disposition  UTILIZATION REVIEW CONTACT:  Sierra Fall  Utilization   Network Utilization Review Department  Phone: [phone] x carefully listen to the prompts  All voicemails are confidential   Email: [email or web address]     PHYSICIAN ADVISORY SERVICES:  FOR [id number] REVIEW - MEDICAL NECESSITY DENIAL  Phone: [phone]  Fax: [phone]  Email: [email or web address]     PRESENTATION DATE: 5/31/2021  9:49 AM  OBERVATION ADMISSION DATE:   INPATIENT ADMISSION DATE: 5/31/21 1045   DISCHARGE DATE: 6/2/2021  2:50 PM  DISPOSITION: Home/Self Care Home/Self Care      IMPORTANT INFORMATION:  Send all requests for admission clinical reviews, approved or denied determinations and any other requests to dedicated fax number below belonging to the campus where the patient is receiving treatment   List of dedicated fax numbers:  [address] [place] [address] DENIALS (Administrative/Medical Necessity) [phone]   [address]  (Maternity/NICU/Pediatrics) [phone]   Conda Sas [phone]   Jamas Piggs [phone]   Aspen Dress [phone]   [facility] [address] [facility] [phone]   [facility]  [phone]   2205 [facility] [address]  2401 [facility] 1000 [address] [facility] [phone]

## 2021-06-11 ENCOUNTER — TELEPHONE (OUTPATIENT)
Dept: NEUROLOGY | Facility: CLINIC | Age: 61
End: 2021-06-11

## 2021-06-11 NOTE — TELEPHONE ENCOUNTER
Spoke with patient who said he had an appointment with [facility] Neurology  I let patient know if any issues arise he could always call us back to schedule  SLOW/CVA/Verify Ins    Notes from chart:  Vivian Roe will need follow up in in 3 months with neurovascular attending or advance practitioner  He will not require outpatient neurological testing

## 2021-07-12 DIAGNOSIS — I63.9 CEREBRAL INFARCTION, UNSPECIFIED (HCC): ICD-10-CM

## 2021-07-12 DIAGNOSIS — I77.9 DISORDER OF ARTERIES AND ARTERIOLES, UNSPECIFIED (HCC): ICD-10-CM

## 2021-08-07 ENCOUNTER — HOSPITAL ENCOUNTER (EMERGENCY)
Facility: HOSPITAL | Age: 61
Discharge: HOME | End: 2021-08-07
Attending: EMERGENCY MEDICINE | Admitting: EMERGENCY MEDICINE
Payer: COMMERCIAL

## 2021-08-07 ENCOUNTER — APPOINTMENT (EMERGENCY)
Dept: CT IMAGING | Facility: HOSPITAL | Age: 61
End: 2021-08-07
Payer: COMMERCIAL

## 2021-08-07 VITALS
DIASTOLIC BLOOD PRESSURE: 78 MMHG | SYSTOLIC BLOOD PRESSURE: 144 MMHG | HEART RATE: 88 BPM | WEIGHT: 190 LBS | BODY MASS INDEX: 28.06 KG/M2 | OXYGEN SATURATION: 96 % | TEMPERATURE: 97.6 F | RESPIRATION RATE: 16 BRPM

## 2021-08-07 DIAGNOSIS — K04.7 DENTAL INFECTION: Primary | ICD-10-CM

## 2021-08-07 DIAGNOSIS — R91.1 NODULE OF RIGHT LUNG: ICD-10-CM

## 2021-08-07 DIAGNOSIS — E04.1 THYROID NODULE: ICD-10-CM

## 2021-08-07 DIAGNOSIS — J38.7 LARYNGEAL NODULE: ICD-10-CM

## 2021-08-07 LAB
ALBUMIN SERPL BCP-MCNC: 3.8 G/DL (ref 3.5–5)
ALP SERPL-CCNC: 94 U/L (ref 46–116)
ALT SERPL W P-5'-P-CCNC: 43 U/L (ref 12–78)
ANION GAP SERPL CALCULATED.3IONS-SCNC: 11 MMOL/L (ref 4–13)
AST SERPL W P-5'-P-CCNC: 24 U/L (ref 5–45)
BASOPHILS # BLD AUTO: 0.04 THOUSANDS/ΜL (ref 0–0.1)
BASOPHILS NFR BLD AUTO: 0 % (ref 0–1)
BILIRUB SERPL-MCNC: 0.69 MG/DL (ref 0.2–1)
BUN SERPL-MCNC: 15 MG/DL (ref 5–25)
CALCIUM SERPL-MCNC: 9.4 MG/DL (ref 8.3–10.1)
CHLORIDE SERPL-SCNC: 98 MMOL/L (ref 100–108)
CO2 SERPL-SCNC: 27 MMOL/L (ref 21–32)
CREAT SERPL-MCNC: 1.12 MG/DL (ref 0.6–1.3)
EOSINOPHIL # BLD AUTO: 0.1 THOUSAND/ΜL (ref 0–0.61)
EOSINOPHIL NFR BLD AUTO: 1 % (ref 0–6)
ERYTHROCYTE [DISTWIDTH] IN BLOOD BY AUTOMATED COUNT: 13.2 % (ref 11.6–15.1)
GFR SERPL CREATININE-BSD FRML MDRD: 70 ML/MIN/1.73SQ M
GLUCOSE SERPL-MCNC: 116 MG/DL (ref 65–140)
HCT VFR BLD AUTO: 44.3 % (ref 36.5–49.3)
HGB BLD-MCNC: 15 G/DL (ref 12–17)
IMM GRANULOCYTES # BLD AUTO: 0.04 THOUSAND/UL (ref 0–0.2)
IMM GRANULOCYTES NFR BLD AUTO: 0 % (ref 0–2)
LYMPHOCYTES # BLD AUTO: 1.59 THOUSANDS/ΜL (ref 0.6–4.47)
LYMPHOCYTES NFR BLD AUTO: 15 % (ref 14–44)
MCH RBC QN AUTO: 30.1 PG (ref 26.8–34.3)
MCHC RBC AUTO-ENTMCNC: 33.9 G/DL (ref 31.4–37.4)
MCV RBC AUTO: 89 FL (ref 82–98)
MONOCYTES # BLD AUTO: 1.03 THOUSAND/ΜL (ref 0.17–1.22)
MONOCYTES NFR BLD AUTO: 10 % (ref 4–12)
NEUTROPHILS # BLD AUTO: 7.82 THOUSANDS/ΜL (ref 1.85–7.62)
NEUTS SEG NFR BLD AUTO: 74 % (ref 43–75)
NRBC BLD AUTO-RTO: 0 /100 WBCS
PLATELET # BLD AUTO: 206 THOUSANDS/UL (ref 149–390)
PMV BLD AUTO: 10.2 FL (ref 8.9–12.7)
POTASSIUM SERPL-SCNC: 4 MMOL/L (ref 3.5–5.3)
PROT SERPL-MCNC: 8.5 G/DL (ref 6.4–8.2)
RBC # BLD AUTO: 4.98 MILLION/UL (ref 3.88–5.62)
SODIUM SERPL-SCNC: 136 MMOL/L (ref 136–145)
WBC # BLD AUTO: 10.62 THOUSAND/UL (ref 4.31–10.16)

## 2021-08-07 PROCEDURE — 36415 COLL VENOUS BLD VENIPUNCTURE: CPT | Performed by: EMERGENCY MEDICINE

## 2021-08-07 PROCEDURE — 80053 COMPREHEN METABOLIC PANEL: CPT | Performed by: EMERGENCY MEDICINE

## 2021-08-07 PROCEDURE — 99284 EMERGENCY DEPT VISIT MOD MDM: CPT

## 2021-08-07 PROCEDURE — 96365 THER/PROPH/DIAG IV INF INIT: CPT

## 2021-08-07 PROCEDURE — 99284 EMERGENCY DEPT VISIT MOD MDM: CPT | Performed by: EMERGENCY MEDICINE

## 2021-08-07 PROCEDURE — 85025 COMPLETE CBC W/AUTO DIFF WBC: CPT | Performed by: EMERGENCY MEDICINE

## 2021-08-07 PROCEDURE — 70491 CT SOFT TISSUE NECK W/DYE: CPT

## 2021-08-07 PROCEDURE — 96375 TX/PRO/DX INJ NEW DRUG ADDON: CPT

## 2021-08-07 RX ORDER — CLINDAMYCIN HYDROCHLORIDE 300 MG/1
300 CAPSULE ORAL EVERY 6 HOURS
Qty: 28 CAPSULE | Refills: 0 | Status: SHIPPED | OUTPATIENT
Start: 2021-08-07 | End: 2021-08-15

## 2021-08-07 RX ORDER — KETOROLAC TROMETHAMINE 10 MG/1
10 TABLET, FILM COATED ORAL EVERY 6 HOURS PRN
Qty: 20 TABLET | Refills: 0 | Status: SHIPPED | OUTPATIENT
Start: 2021-08-07

## 2021-08-07 RX ORDER — KETOROLAC TROMETHAMINE 30 MG/ML
15 INJECTION, SOLUTION INTRAMUSCULAR; INTRAVENOUS ONCE
Status: COMPLETED | OUTPATIENT
Start: 2021-08-07 | End: 2021-08-07

## 2021-08-07 RX ORDER — CLINDAMYCIN PHOSPHATE 600 MG/50ML
600 INJECTION INTRAVENOUS ONCE
Status: COMPLETED | OUTPATIENT
Start: 2021-08-07 | End: 2021-08-07

## 2021-08-07 RX ADMIN — IOHEXOL 100 ML: 350 INJECTION, SOLUTION INTRAVENOUS at 13:38

## 2021-08-07 RX ADMIN — KETOROLAC TROMETHAMINE 15 MG: 30 INJECTION, SOLUTION INTRAMUSCULAR at 13:06

## 2021-08-07 RX ADMIN — CLINDAMYCIN PHOSPHATE 600 MG: 600 INJECTION, SOLUTION INTRAVENOUS at 13:07

## 2021-08-07 NOTE — ED PROVIDER NOTES
History  Chief Complaint   Patient presents with    Dental Pain     pt states left dental swelling started monday and saw pcp who put him on antibiotic thursday  states pain and swelling getting worse  denies difficulty swallowing     Pleasant 52-year-old male with a history of CVA presents emergency room with chief complaint of worsening swelling to the left side of his face  Patient reports that on Monday he started to have some swelling on the left side of his face that he believed to be a dental abscess secondary to dental caries  Patient reports that this progressed throughout the course the weekend he saw his family physician on Thursday  He was given a shot Toradol and started on Augmentin  He was advised to follow-up with dentistry  Patient apparently started to get improvement in his pain but he has now had swelling that is tracking down into the left side of his neck and over  the thyroid cartilage  Patient is reporting some slight difficulty in opening his mouth compared to his baseline  Also reporting left-sided ear pain  Patient denies any fevers or chills  No nausea vomiting or diarrhea  No chest pain or shortness of breath  History provided by:  Patient and relative  Dental Pain  Location:  Lower  Quality:  Aching, sharp and shooting  Severity:  Mild  Onset quality:  Gradual  Duration:  5 days  Timing:  Constant  Progression:  Worsening  Chronicity:  New  Context: abscess and poor dentition    Previous work-up:  Dental exam  Relieved by:  Acetaminophen  Worsened by:  Nothing  Associated symptoms: facial pain, facial swelling, gum swelling, neck swelling and trismus (Mild)    Associated symptoms: no congestion, no difficulty swallowing, no drooling, no fever, no headaches, no neck pain and no oral lesions        Prior to Admission Medications   Prescriptions Last Dose Informant Patient Reported?  Taking?   aspirin (Aspirin 81) 81 mg chewable tablet   No No   Sig: Chew 1 tablet (81 mg total) daily for 21 days   atorvastatin (LIPITOR) 40 mg tablet   No No   Sig: Take 1 tablet (40 mg total) by mouth daily with dinner   clopidogrel (PLAVIX) 75 mg tablet   No No   Sig: Take 1 tablet (75 mg total) by mouth daily   lisinopril (ZESTRIL) 10 mg tablet   No No   Sig: Take 1 tablet (10 mg total) by mouth daily      Facility-Administered Medications: None       Past Medical History:   Diagnosis Date    History of high cholesterol     Hypertension     Stroke [facility])        Past Surgical History:   Procedure Laterality Date    HERNIA REPAIR         History reviewed  No pertinent family history  I have reviewed and agree with the history as documented  E-Cigarette/Vaping    E-Cigarette Use Never User      E-Cigarette/Vaping Substances     Social History     Tobacco Use    Smoking status: Never Smoker    Smokeless tobacco: Never Used   Vaping Use    Vaping Use: Never used   Substance Use Topics    Alcohol use: Yes    Drug use: Never       Review of Systems   Constitutional: Negative  Negative for chills, diaphoresis and fever  HENT: Positive for ear pain and facial swelling  Negative for congestion, drooling, mouth sores, sinus pressure, sore throat, trouble swallowing and voice change  Respiratory: Negative  Negative for cough, shortness of breath and wheezing  Cardiovascular: Negative for chest pain and leg swelling  Gastrointestinal: Negative  Negative for abdominal pain, nausea and vomiting  Musculoskeletal: Negative  Negative for neck pain  Skin: Negative for wound  Neurological: Negative for headaches  All other systems reviewed and are negative  Physical Exam  Physical Exam  Vitals and nursing note reviewed  Constitutional:       General: He is not in acute distress  Appearance: He is normal weight  He is not ill-appearing or toxic-appearing  HENT:      Head: Normocephalic        Right Ear: Hearing and external ear normal       Left Ear: Hearing and external ear normal  No swelling or tenderness  There is impacted cerumen  Nose: Nose normal       Mouth/Throat:      Lips: Pink  Dentition: Abnormal dentition  Gingival swelling and dental abscesses present  No dental tenderness  Pharynx: Oropharynx is clear  Uvula midline  Eyes:      Pupils: Pupils are equal, round, and reactive to light  Neck:      Thyroid: No thyroid tenderness  Trachea: Trachea and phonation normal      Pulmonary:      Effort: Pulmonary effort is normal       Breath sounds: No stridor  No wheezing, rhonchi or rales  Abdominal:      General: Abdomen is flat  Musculoskeletal:      Cervical back: No edema or crepitus  Right lower leg: No edema  Left lower leg: No edema  Skin:     General: Skin is warm  Capillary Refill: Capillary refill takes less than 2 seconds  Neurological:      General: No focal deficit present  Mental Status: He is alert  Psychiatric:         Mood and Affect: Mood normal          Thought Content:  Thought content normal          Judgment: Judgment normal          Vital Signs  ED Triage Vitals [08/07/21 1234]   Temperature Pulse Respirations Blood Pressure SpO2   97 6 °F (36 4 °C) 95 17 (!) 161/113 95 %      Temp Source Heart Rate Source Patient Position - Orthostatic VS BP Location FiO2 (%)   Temporal Monitor Sitting Right arm --      Pain Score       7           Vitals:    08/07/21 1234 08/07/21 1448   BP: (!) 161/113 144/78   Pulse: 95 88   Patient Position - Orthostatic VS: Sitting Sitting         Visual Acuity      ED Medications  Medications   clindamycin (CLEOCIN) IVPB (premix in dextrose) 600 mg 50 mL (0 mg Intravenous Stopped 8/7/21 1337)   ketorolac (TORADOL) injection 15 mg (15 mg Intravenous Given 8/7/21 1306)   iohexol (OMNIPAQUE) 350 MG/ML injection (SINGLE-DOSE) 100 mL (100 mL Intravenous Given 8/7/21 1338)       Diagnostic Studies  Results Reviewed     Procedure Component Value Units Date/Time    Comprehensive metabolic panel [[id number]]  (Abnormal) Collected: 08/07/21 1300    Lab Status: Final result Specimen: Blood from Arm, Right Updated: 08/07/21 1322     Sodium 136 mmol/L      Potassium 4 0 mmol/L      Chloride 98 mmol/L      CO2 27 mmol/L      ANION GAP 11 mmol/L      BUN 15 mg/dL      Creatinine 1 12 mg/dL      Glucose 116 mg/dL      Calcium 9 4 mg/dL      AST 24 U/L      ALT 43 U/L      Alkaline Phosphatase 94 U/L      Total Protein 8 5 g/dL      Albumin 3 8 g/dL      Total Bilirubin 0 69 mg/dL      eGFR 70 ml/min/1 73sq m     Narrative:      National Kidney Disease Foundation guidelines for Chronic Kidney Disease (CKD):     Stage 1 with normal or high GFR (GFR > 90 mL/min/1 73 square meters)    Stage 2 Mild CKD (GFR = 60-89 mL/min/1 73 square meters)    Stage 3A Moderate CKD (GFR = 45-59 mL/min/1 73 square meters)    Stage 3B Moderate CKD (GFR = 30-44 mL/min/1 73 square meters)    Stage 4 Severe CKD (GFR = 15-29 mL/min/1 73 square meters)    Stage 5 End Stage CKD (GFR <15 mL/min/1 73 square meters)  Note: GFR calculation is accurate only with a steady state creatinine    CBC and differential [[id number]]  (Abnormal) Collected: 08/07/21 1300    Lab Status: Final result Specimen: Blood from Arm, Right Updated: 08/07/21 1307     WBC 10 62 Thousand/uL      RBC 4 98 Million/uL      Hemoglobin 15 0 g/dL      Hematocrit 44 3 %      MCV 89 fL      MCH 30 1 pg      MCHC 33 9 g/dL      RDW 13 2 %      MPV 10 2 fL      Platelets 549 Thousands/uL      nRBC 0 /100 WBCs      Neutrophils Relative 74 %      Immat GRANS % 0 %      Lymphocytes Relative 15 %      Monocytes Relative 10 %      Eosinophils Relative 1 %      Basophils Relative 0 %      Neutrophils Absolute 7 82 Thousands/µL      Immature Grans Absolute 0 04 Thousand/uL      Lymphocytes Absolute 1 59 Thousands/µL      Monocytes Absolute 1 03 Thousand/µL      Eosinophils Absolute 0 10 Thousand/µL      Basophils Absolute 0 04 Thousands/µL                  CT soft tissue neck with contrast   Final Result by Enmanuel Benral MD (08/07 1416)      1   1 cm mucosal lesion with peripheral rim enhancement on left side of the oropharynx  Differential diagnosis includes squamous cell carcinoma  Other etiologies including a cyst could be considered  Nonemergent direct visualization/ENT assessment    advised  2   Mild left  space cellulitis with slight induration/phlegmonous changes adjacent to the left mandible  No discrete  space abscess  I personally discussed this study with Lorenzo Klein on 8/7/2021 at 2:16 PM                      Workstation performed: [id number]                    Procedures  Procedures         ED Course  ED Course as of Aug 07 1451   Sat Aug 07, 2021   1450 Patient has no discernible abscess identified on his imaging with regards to his dental infection  He was switched to clindamycin and started on Toradol for pain  He was advised to follow-up with oral surgery  They remarked that they would do so at [facility]  Also discussed with the patient's family the incidental findings of the oropharyngeal nodule, thyroid nodule, and right upper lung calcified structure  Have recommended starting follow-up with ear nose and throat  They will follow-up as well in the [facility] system  Patient was discharged in stable condition                                                MDM  Number of Diagnoses or Management Options  Dental infection: new and requires workup  Laryngeal nodule: new and requires workup  Nodule of right lung: new and requires workup  Thyroid nodule: new and requires workup     Amount and/or Complexity of Data Reviewed  Clinical lab tests: ordered and reviewed  Tests in the radiology section of CPT®: ordered and reviewed    Risk of Complications, Morbidity, and/or Mortality  Presenting problems: moderate  Diagnostic procedures: moderate  Management options: moderate    Patient Progress  Patient progress: improved      Disposition  Final diagnoses:   Dental infection   Nodule of right lung   Laryngeal nodule   Thyroid nodule     Time reflects when diagnosis was documented in both MDM as applicable and the Disposition within this note     Time User Action Codes Description Comment    8/7/2021  2:41 PM Abraham Sebastian Add [K04 7] Dental infection     8/7/2021  2:49 PM Jesica Knutson Add [R91 1] Nodule of right lung     8/7/2021  2:49 PM Abraham Smack Add [J38 7] Laryngeal nodule     8/7/2021  2:50 PM Abraham Smack Add [E04 1] Thyroid nodule       ED Disposition     ED Disposition Condition Date/Time Comment    Discharge Stable Sat Aug 7, 2021  2:42 PM Laura Stalls discharge to home/self care  Follow-up Information    None         Patient's Medications   Discharge Prescriptions    CLINDAMYCIN (CLEOCIN) 300 MG CAPSULE    Take 1 capsule (300 mg total) by mouth every 6 (six) hours for 7 days       Start Date: 8/7/2021  End Date: 8/14/2021       Order Dose: 300 mg       Quantity: 28 capsule    Refills: 0    KETOROLAC (TORADOL) 10 MG TABLET    Take 1 tablet (10 mg total) by mouth every 6 (six) hours as needed for mild pain or moderate pain       Start Date: 8/7/2021  End Date: --       Order Dose: 10 mg       Quantity: 20 tablet    Refills: 0     No discharge procedures on file      PDMP Review     None          ED Provider  Electronically Signed by           Daryle Martins, DO  08/07/21 1388

## 2021-08-07 NOTE — DISCHARGE INSTRUCTIONS
With regards to dental infection:  Please follow-up with oral surgery at [facility]  You mentioned that you have the phone number contact them  Alternatively you may also wish to follow-up with [facility]  [address] [facility] [address] [facility]   [address] [facility]   [address] [facility]   [address] [place] [id number]   [phone]       With regards to the incidental findings identified on the soft tissue of your neck, your thyroid and your right upper lung:  Please follow-up with ear nose and throat at [facility]  You mentioned that you also had the number for them    You may stop the Augmentin and start the clindamycin    Return with any worsening

## 2021-08-15 ENCOUNTER — HOSPITAL ENCOUNTER (INPATIENT)
Facility: HOSPITAL | Age: 61
LOS: 3 days | Discharge: HOME | DRG: 254 | End: 2021-08-18
Attending: EMERGENCY MEDICINE | Admitting: FAMILY MEDICINE
Payer: COMMERCIAL

## 2021-08-15 ENCOUNTER — APPOINTMENT (EMERGENCY)
Dept: RADIOLOGY | Facility: HOSPITAL | Age: 61
DRG: 254 | End: 2021-08-15
Payer: COMMERCIAL

## 2021-08-15 DIAGNOSIS — D64.9 ANEMIA: ICD-10-CM

## 2021-08-15 DIAGNOSIS — R07.9 CHEST PAIN: ICD-10-CM

## 2021-08-15 DIAGNOSIS — R07.89 CHEST DISCOMFORT: ICD-10-CM

## 2021-08-15 DIAGNOSIS — D62 ACUTE BLOOD LOSS ANEMIA: ICD-10-CM

## 2021-08-15 DIAGNOSIS — K92.2 UPPER GI BLEEDING: Primary | ICD-10-CM

## 2021-08-15 DIAGNOSIS — R10.13 ABDOMINAL DISCOMFORT, EPIGASTRIC: ICD-10-CM

## 2021-08-15 DIAGNOSIS — R77.8 TROPONIN LEVEL ELEVATED: ICD-10-CM

## 2021-08-15 DIAGNOSIS — K92.2 ACUTE GI BLEEDING: ICD-10-CM

## 2021-08-15 PROBLEM — R74.01 TRANSAMINITIS: Status: ACTIVE | Noted: 2021-08-15

## 2021-08-15 PROBLEM — K04.7 DENTAL ABSCESS: Status: ACTIVE | Noted: 2021-08-15

## 2021-08-15 PROBLEM — E87.6 HYPOKALEMIA: Status: ACTIVE | Noted: 2021-08-15

## 2021-08-15 LAB
ABO GROUP BLD: NORMAL
ABO GROUP BLD: NORMAL
ALBUMIN SERPL BCP-MCNC: 3 G/DL (ref 3.5–5)
ALP SERPL-CCNC: 82 U/L (ref 46–116)
ALT SERPL W P-5'-P-CCNC: 120 U/L (ref 12–78)
ANION GAP SERPL CALCULATED.3IONS-SCNC: 9 MMOL/L (ref 4–13)
APTT PPP: 30 SECONDS (ref 23–37)
AST SERPL W P-5'-P-CCNC: 52 U/L (ref 5–45)
BASOPHILS # BLD AUTO: 0.06 THOUSANDS/ΜL (ref 0–0.1)
BASOPHILS NFR BLD AUTO: 1 % (ref 0–1)
BILIRUB SERPL-MCNC: 0.22 MG/DL (ref 0.2–1)
BLD GP AB SCN SERPL QL: NEGATIVE
BUN SERPL-MCNC: 23 MG/DL (ref 5–25)
CALCIUM ALBUM COR SERPL-MCNC: 9.1 MG/DL (ref 8.3–10.1)
CALCIUM SERPL-MCNC: 8.3 MG/DL (ref 8.3–10.1)
CHLORIDE SERPL-SCNC: 104 MMOL/L (ref 100–108)
CO2 SERPL-SCNC: 26 MMOL/L (ref 21–32)
CREAT SERPL-MCNC: 1.09 MG/DL (ref 0.6–1.3)
EOSINOPHIL # BLD AUTO: 0.18 THOUSAND/ΜL (ref 0–0.61)
EOSINOPHIL NFR BLD AUTO: 2 % (ref 0–6)
ERYTHROCYTE [DISTWIDTH] IN BLOOD BY AUTOMATED COUNT: 13.3 % (ref 11.6–15.1)
EXT FECAL OCCULT BLOOD SCREEN: POSITIVE
EXT. CONTROL ED NAV: ABNORMAL
GFR SERPL CREATININE-BSD FRML MDRD: 72 ML/MIN/1.73SQ M
GLUCOSE SERPL-MCNC: 146 MG/DL (ref 65–140)
HCT VFR BLD AUTO: 22.7 % (ref 36.5–49.3)
HGB BLD-MCNC: 7.6 G/DL (ref 12–17)
IMM GRANULOCYTES # BLD AUTO: 0.14 THOUSAND/UL (ref 0–0.2)
IMM GRANULOCYTES NFR BLD AUTO: 1 % (ref 0–2)
INR PPP: 0.99 (ref 0.84–1.19)
LIPASE SERPL-CCNC: 178 U/L (ref 73–393)
LYMPHOCYTES # BLD AUTO: 1.71 THOUSANDS/ΜL (ref 0.6–4.47)
LYMPHOCYTES NFR BLD AUTO: 17 % (ref 14–44)
MCH RBC QN AUTO: 30.5 PG (ref 26.8–34.3)
MCHC RBC AUTO-ENTMCNC: 33.5 G/DL (ref 31.4–37.4)
MCV RBC AUTO: 91 FL (ref 82–98)
MONOCYTES # BLD AUTO: 0.77 THOUSAND/ΜL (ref 0.17–1.22)
MONOCYTES NFR BLD AUTO: 7 % (ref 4–12)
NEUTROPHILS # BLD AUTO: 7.53 THOUSANDS/ΜL (ref 1.85–7.62)
NEUTS SEG NFR BLD AUTO: 72 % (ref 43–75)
NRBC BLD AUTO-RTO: 0 /100 WBCS
NT-PROBNP SERPL-MCNC: 98 PG/ML
PLATELET # BLD AUTO: 286 THOUSANDS/UL (ref 149–390)
PMV BLD AUTO: 9.9 FL (ref 8.9–12.7)
POTASSIUM SERPL-SCNC: 3.4 MMOL/L (ref 3.5–5.3)
PROT SERPL-MCNC: 6.5 G/DL (ref 6.4–8.2)
PROTHROMBIN TIME: 12.9 SECONDS (ref 11.6–14.5)
RBC # BLD AUTO: 2.49 MILLION/UL (ref 3.88–5.62)
RH BLD: POSITIVE
RH BLD: POSITIVE
SODIUM SERPL-SCNC: 139 MMOL/L (ref 136–145)
SPECIMEN EXPIRATION DATE: NORMAL
TROPONIN I SERPL-MCNC: 0.06 NG/ML
TROPONIN I SERPL-MCNC: <0.02 NG/ML
WBC # BLD AUTO: 10.39 THOUSAND/UL (ref 4.31–10.16)

## 2021-08-15 PROCEDURE — 83880 ASSAY OF NATRIURETIC PEPTIDE: CPT | Performed by: EMERGENCY MEDICINE

## 2021-08-15 PROCEDURE — C9113 INJ PANTOPRAZOLE SODIUM, VIA: HCPCS | Performed by: EMERGENCY MEDICINE

## 2021-08-15 PROCEDURE — 80053 COMPREHEN METABOLIC PANEL: CPT | Performed by: EMERGENCY MEDICINE

## 2021-08-15 PROCEDURE — 99285 EMERGENCY DEPT VISIT HI MDM: CPT | Performed by: EMERGENCY MEDICINE

## 2021-08-15 PROCEDURE — 84484 ASSAY OF TROPONIN QUANT: CPT | Performed by: EMERGENCY MEDICINE

## 2021-08-15 PROCEDURE — 85025 COMPLETE CBC W/AUTO DIFF WBC: CPT | Performed by: EMERGENCY MEDICINE

## 2021-08-15 PROCEDURE — 99285 EMERGENCY DEPT VISIT HI MDM: CPT

## 2021-08-15 PROCEDURE — 30233N1 TRANSFUSION OF NONAUTOLOGOUS RED BLOOD CELLS INTO PERIPHERAL VEIN, PERCUTANEOUS APPROACH: ICD-10-PCS | Performed by: FAMILY MEDICINE

## 2021-08-15 PROCEDURE — 86850 RBC ANTIBODY SCREEN: CPT | Performed by: EMERGENCY MEDICINE

## 2021-08-15 PROCEDURE — 85610 PROTHROMBIN TIME: CPT | Performed by: EMERGENCY MEDICINE

## 2021-08-15 PROCEDURE — P9016 RBC LEUKOCYTES REDUCED: HCPCS

## 2021-08-15 PROCEDURE — 83690 ASSAY OF LIPASE: CPT | Performed by: EMERGENCY MEDICINE

## 2021-08-15 PROCEDURE — 93005 ELECTROCARDIOGRAM TRACING: CPT

## 2021-08-15 PROCEDURE — 96374 THER/PROPH/DIAG INJ IV PUSH: CPT

## 2021-08-15 PROCEDURE — 36415 COLL VENOUS BLD VENIPUNCTURE: CPT | Performed by: EMERGENCY MEDICINE

## 2021-08-15 PROCEDURE — 82728 ASSAY OF FERRITIN: CPT | Performed by: FAMILY MEDICINE

## 2021-08-15 PROCEDURE — 83540 ASSAY OF IRON: CPT | Performed by: FAMILY MEDICINE

## 2021-08-15 PROCEDURE — 83550 IRON BINDING TEST: CPT | Performed by: FAMILY MEDICINE

## 2021-08-15 PROCEDURE — 86920 COMPATIBILITY TEST SPIN: CPT

## 2021-08-15 PROCEDURE — 71045 X-RAY EXAM CHEST 1 VIEW: CPT

## 2021-08-15 PROCEDURE — 86900 BLOOD TYPING SEROLOGIC ABO: CPT | Performed by: EMERGENCY MEDICINE

## 2021-08-15 PROCEDURE — 99222 1ST HOSP IP/OBS MODERATE 55: CPT | Performed by: FAMILY MEDICINE

## 2021-08-15 PROCEDURE — 86901 BLOOD TYPING SEROLOGIC RH(D): CPT | Performed by: EMERGENCY MEDICINE

## 2021-08-15 PROCEDURE — 85730 THROMBOPLASTIN TIME PARTIAL: CPT | Performed by: EMERGENCY MEDICINE

## 2021-08-15 PROCEDURE — 82270 OCCULT BLOOD FECES: CPT | Performed by: EMERGENCY MEDICINE

## 2021-08-15 RX ORDER — ACETAMINOPHEN 325 MG/1
650 TABLET ORAL EVERY 6 HOURS PRN
Status: DISCONTINUED | OUTPATIENT
Start: 2021-08-15 | End: 2021-08-18 | Stop reason: HOSPADM

## 2021-08-15 RX ORDER — ASPIRIN 81 MG/1
324 TABLET, CHEWABLE ORAL ONCE
Status: DISCONTINUED | OUTPATIENT
Start: 2021-08-15 | End: 2021-08-15

## 2021-08-15 RX ORDER — CLINDAMYCIN PHOSPHATE 600 MG/50ML
600 INJECTION INTRAVENOUS EVERY 8 HOURS
Status: COMPLETED | OUTPATIENT
Start: 2021-08-15 | End: 2021-08-18

## 2021-08-15 RX ORDER — AMLODIPINE BESYLATE 5 MG/1
5 TABLET ORAL
COMMUNITY

## 2021-08-15 RX ORDER — HYDROCHLOROTHIAZIDE 12.5 MG/1
12.5 TABLET ORAL DAILY
COMMUNITY
Start: 2021-08-05 | End: 2021-09-04

## 2021-08-15 RX ORDER — PANTOPRAZOLE SODIUM 40 MG/1
40 INJECTION, POWDER, FOR SOLUTION INTRAVENOUS ONCE
Status: COMPLETED | OUTPATIENT
Start: 2021-08-15 | End: 2021-08-15

## 2021-08-15 RX ORDER — ONDANSETRON 2 MG/ML
4 INJECTION INTRAMUSCULAR; INTRAVENOUS EVERY 6 HOURS PRN
Status: DISCONTINUED | OUTPATIENT
Start: 2021-08-15 | End: 2021-08-18 | Stop reason: HOSPADM

## 2021-08-15 RX ORDER — ATORVASTATIN CALCIUM 40 MG/1
40 TABLET, FILM COATED ORAL
Status: DISCONTINUED | OUTPATIENT
Start: 2021-08-15 | End: 2021-08-18 | Stop reason: HOSPADM

## 2021-08-15 RX ORDER — SODIUM CHLORIDE, SODIUM GLUCONATE, SODIUM ACETATE, POTASSIUM CHLORIDE, MAGNESIUM CHLORIDE, SODIUM PHOSPHATE, DIBASIC, AND POTASSIUM PHOSPHATE .53; .5; .37; .037; .03; .012; .00082 G/100ML; G/100ML; G/100ML; G/100ML; G/100ML; G/100ML; G/100ML
100 INJECTION, SOLUTION INTRAVENOUS CONTINUOUS
Status: DISCONTINUED | OUTPATIENT
Start: 2021-08-15 | End: 2021-08-16

## 2021-08-15 RX ORDER — POTASSIUM CHLORIDE 14.9 MG/ML
20 INJECTION INTRAVENOUS ONCE
Status: COMPLETED | OUTPATIENT
Start: 2021-08-15 | End: 2021-08-15

## 2021-08-15 RX ORDER — AMOXICILLIN AND CLAVULANATE POTASSIUM 875; 125 MG/1; MG/1
1 TABLET, FILM COATED ORAL 2 TIMES DAILY
COMMUNITY
Start: 2021-08-05 | End: 2021-08-18 | Stop reason: HOSPADM

## 2021-08-15 RX ADMIN — ATORVASTATIN CALCIUM 40 MG: 40 TABLET, FILM COATED ORAL at 18:37

## 2021-08-15 RX ADMIN — SODIUM CHLORIDE, SODIUM GLUCONATE, SODIUM ACETATE, POTASSIUM CHLORIDE, MAGNESIUM CHLORIDE, SODIUM PHOSPHATE, DIBASIC, AND POTASSIUM PHOSPHATE 125 ML/HR: .53; .5; .37; .037; .03; .012; .00082 INJECTION, SOLUTION INTRAVENOUS at 21:48

## 2021-08-15 RX ADMIN — POTASSIUM CHLORIDE 20 MEQ: 14.9 INJECTION, SOLUTION INTRAVENOUS at 21:03

## 2021-08-15 RX ADMIN — PANTOPRAZOLE SODIUM 8 MG/HR: 40 INJECTION, POWDER, FOR SOLUTION INTRAVENOUS at 17:45

## 2021-08-15 RX ADMIN — CLINDAMYCIN IN 5 PERCENT DEXTROSE 600 MG: 12 INJECTION, SOLUTION INTRAVENOUS at 21:19

## 2021-08-15 RX ADMIN — PANTOPRAZOLE SODIUM 40 MG: 40 INJECTION, POWDER, FOR SOLUTION INTRAVENOUS at 16:56

## 2021-08-15 NOTE — ED PROVIDER NOTES
History  Chief Complaint   Patient presents with    Chest Pain     pt states he has been having black diarrhea for the past week  on plavix  started having chest pressure/pain yesterday  denies N/V, dizziness     Patient is a 55-year-old male with history of recent CVA on Plavix and aspirin daily recently seen in the ED for a dental infection which he followed up with a oral surgeon for  no surgery scheduled is continuing antibiotics for this infection currently and following with oral surgery for this  Patient presents the ED today due to epigastric abdominal pain and lower chest pain and pain and discomfort and black stools that have been ongoing for about 3 or 4 days now  History provided by:  Patient  Chest Pain  Pain location:  Epigastric  Pain quality: aching    Pain severity:  Mild  Onset quality:  Gradual  Duration:  3 days  Timing:  Intermittent  Progression:  Waxing and waning  Chronicity:  New  Associated symptoms: abdominal pain    Associated symptoms: no cough, no dizziness, no fatigue, no fever, no headache, no nausea, no numbness, no palpitations, no shortness of breath, not vomiting and no weakness        Prior to Admission Medications   Prescriptions Last Dose Informant Patient Reported?  Taking?   amoxicillin-clavulanate (AUGMENTIN) 875-125 mg per tablet 8/15/2021 at Unknown time  Yes Yes   Sig: Take 1 tablet by mouth 2 (two) times a day   atorvastatin (LIPITOR) 40 mg tablet 8/14/2021 at Unknown time  No Yes   Sig: Take 1 tablet (40 mg total) by mouth daily with dinner   clopidogrel (PLAVIX) 75 mg tablet 8/15/2021 at Unknown time  No Yes   Sig: Take 1 tablet (75 mg total) by mouth daily   hydrochlorothiazide (HYDRODIURIL) 12 5 mg tablet 8/15/2021 at Unknown time  Yes Yes   Sig: Take 12 5 mg by mouth daily   ketorolac (TORADOL) 10 mg tablet Past Month at Unknown time  No Yes   Sig: Take 1 tablet (10 mg total) by mouth every 6 (six) hours as needed for mild pain or moderate pain   lisinopril (ZESTRIL) 10 mg tablet 8/15/2021 at Unknown time  No Yes   Sig: Take 1 tablet (10 mg total) by mouth daily      Facility-Administered Medications: None       Past Medical History:   Diagnosis Date    History of high cholesterol     Hypertension     Stroke [facility])        Past Surgical History:   Procedure Laterality Date    HERNIA REPAIR         History reviewed  No pertinent family history  I have reviewed and agree with the history as documented  E-Cigarette/Vaping    E-Cigarette Use Never User      E-Cigarette/Vaping Substances     Social History     Tobacco Use    Smoking status: Never Smoker    Smokeless tobacco: Never Used   Vaping Use    Vaping Use: Never used   Substance Use Topics    Alcohol use: Yes    Drug use: Never       Review of Systems   Constitutional: Negative for activity change, appetite change, chills, fatigue and fever  HENT: Negative for congestion, ear pain, rhinorrhea and sore throat  Eyes: Negative for discharge, redness and visual disturbance  Respiratory: Negative for cough, chest tightness, shortness of breath and wheezing  Cardiovascular: Positive for chest pain  Negative for palpitations  Gastrointestinal: Positive for abdominal pain  Negative for constipation, diarrhea, nausea and vomiting  Black stool   Endocrine: Negative for polydipsia and polyuria  Genitourinary: Negative for difficulty urinating, dysuria, frequency, hematuria and urgency  Musculoskeletal: Negative for arthralgias and myalgias  Skin: Negative for color change, pallor and rash  Neurological: Negative for dizziness, weakness, light-headedness, numbness and headaches  Hematological: Negative for adenopathy  Does not bruise/bleed easily  All other systems reviewed and are negative  Physical Exam  Physical Exam  Vitals and nursing note reviewed  Constitutional:       Appearance: He is well-developed  HENT:      Head: Normocephalic and atraumatic        Right Ear: External ear normal       Left Ear: External ear normal       Nose: Nose normal    Eyes:      Conjunctiva/sclera: Conjunctivae normal       Pupils: Pupils are equal, round, and reactive to light  Cardiovascular:      Rate and Rhythm: Normal rate and regular rhythm  Heart sounds: Normal heart sounds  Pulmonary:      Effort: Pulmonary effort is normal  No respiratory distress  Breath sounds: Normal breath sounds  No wheezing or rales  Chest:      Chest wall: No tenderness  Abdominal:      General: Bowel sounds are normal  There is no distension  Palpations: Abdomen is soft  Tenderness: There is abdominal tenderness in the epigastric area  There is no guarding or rebound  Genitourinary:     Rectum: Guaiac result positive  No tenderness or external hemorrhoid  Normal anal tone  Comments: Dark black stool strongly heme-positive  Musculoskeletal:         General: Normal range of motion  Cervical back: Normal range of motion and neck supple  Skin:     General: Skin is warm and dry  Neurological:      Mental Status: He is alert and oriented to person, place, and time  Cranial Nerves: No cranial nerve deficit  Sensory: No sensory deficit           Vital Signs  ED Triage Vitals   Temperature Pulse Respirations Blood Pressure SpO2   08/15/21 1724 08/15/21 1641 08/15/21 1641 08/15/21 1641 08/15/21 1641   98 1 °F (36 7 °C) 103 20 113/67 99 %      Temp Source Heart Rate Source Patient Position - Orthostatic VS BP Location FiO2 (%)   08/15/21 1724 08/15/21 1641 08/15/21 1641 08/15/21 1641 --   Temporal Monitor Lying Right arm       Pain Score       08/15/21 1641       1           Vitals:    08/15/21 1641   BP: 113/67   Pulse: 103   Patient Position - Orthostatic VS: Lying         Visual Acuity      ED Medications  Medications   pantoprazole (PROTONIX) 80 mg in sodium chloride 0 9 % 100 mL infusion (has no administration in time range)   pantoprazole (PROTONIX) injection 40 mg (40 mg Intravenous Given 8/15/21 1656)       Diagnostic Studies  Results Reviewed     Procedure Component Value Units Date/Time    Troponin I repeat in 3 hrs [[id number]]  (Normal) Collected: 08/15/21 1652    Lab Status: Final result Specimen: Blood from Arm, Left Updated: 08/15/21 1727     Troponin I <0 02 ng/mL     Lipase [[id number]]  (Normal) Collected: 08/15/21 1652    Lab Status: Final result Specimen: Blood from Arm, Left Updated: 08/15/21 1727     Lipase 178 u/L     NT-BNP PRO [[id number]]  (Normal) Collected: 08/15/21 1652    Lab Status: Final result Specimen: Blood from Arm, Left Updated: 08/15/21 1727     NT-proBNP 98 pg/mL     Comprehensive metabolic panel [[id number]]  (Abnormal) Collected: 08/15/21 1652    Lab Status: Final result Specimen: Blood from Arm, Left Updated: 08/15/21 1725     Sodium 139 mmol/L      Potassium 3 4 mmol/L      Chloride 104 mmol/L      CO2 26 mmol/L      ANION GAP 9 mmol/L      BUN 23 mg/dL      Creatinine 1 09 mg/dL      Glucose 146 mg/dL      Calcium 8 3 mg/dL      Corrected Calcium 9 1 mg/dL      AST 52 U/L       U/L      Alkaline Phosphatase 82 U/L      Total Protein 6 5 g/dL      Albumin 3 0 g/dL      Total Bilirubin 0 22 mg/dL      eGFR 72 ml/min/1 73sq m     Narrative:      [place] guidelines for Chronic Kidney Disease (CKD):     Stage 1 with normal or high GFR (GFR > 90 mL/min/1 73 square meters)    Stage 2 Mild CKD (GFR = 60-89 mL/min/1 73 square meters)    Stage 3A Moderate CKD (GFR = 45-59 mL/min/1 73 square meters)    Stage 3B Moderate CKD (GFR = 30-44 mL/min/1 73 square meters)    Stage 4 Severe CKD (GFR = 15-29 mL/min/1 73 square meters)    Stage 5 End Stage CKD (GFR <15 mL/min/1 73 square meters)  Note: GFR calculation is accurate only with a steady state creatinine    Protime-INR [[id number]]  (Normal) Collected: 08/15/21 1652    Lab Status: Final result Specimen: Blood from Arm, Left Updated: 08/15/21 1717     Protime 12 9 seconds      INR 0 99    APTT [[id number]]  (Normal) Collected: 08/15/21 1652    Lab Status: Final result Specimen: Blood from Arm, Left Updated: 08/15/21 1717     PTT 30 seconds     CBC and differential [[id number]]  (Abnormal) Collected: 08/15/21 1652    Lab Status: Final result Specimen: Blood from Arm, Left Updated: 08/15/21 1704     WBC 10 39 Thousand/uL      RBC 2 49 Million/uL      Hemoglobin 7 6 g/dL      Hematocrit 22 7 %      MCV 91 fL      MCH 30 5 pg      MCHC 33 5 g/dL      RDW 13 3 %      MPV 9 9 fL      Platelets 919 Thousands/uL      nRBC 0 /100 WBCs      Neutrophils Relative 72 %      Immat GRANS % 1 %      Lymphocytes Relative 17 %      Monocytes Relative 7 %      Eosinophils Relative 2 %      Basophils Relative 1 %      Neutrophils Absolute 7 53 Thousands/µL      Immature Grans Absolute 0 14 Thousand/uL      Lymphocytes Absolute 1 71 Thousands/µL      Monocytes Absolute 0 77 Thousand/µL      Eosinophils Absolute 0 18 Thousand/µL      Basophils Absolute 0 06 Thousands/µL     POCT occult blood stool [[id number]]  (Abnormal) Resulted: 08/15/21 1653    Lab Status: Final result Specimen: Stool Updated: 08/15/21 1653     EXT Fecal Occult Blood (Ref: Negative) positive     Control valid    Troponin I [[id number]] Collected: 08/15/21 1652    Lab Status: No result Specimen: Blood from Arm, Left                  XR chest 1 view portable   ED Interpretation by Abhishek Kwon DO (08/15 1721)   No acute cardiopulmonary disease                 Procedures  ECG 12 Lead Documentation Only    Date/Time: 8/15/2021 4:43 PM  Performed by: Abhishek Kwon DO  Authorized by:  Abhishek Kwon DO     ECG reviewed by me, the ED Provider: yes    Patient location:  ED  Previous ECG:     Comparison to cardiac monitor: Yes    Quality:     Tracing quality:  Limited by artifact  Rate:     ECG rate:  103    ECG rate assessment: tachycardic    Rhythm:     Rhythm: sinus tachycardia    QRS:     QRS axis:  Normal    QRS intervals: Normal  ST segments:     ST segments:  Normal  T waves:     T waves: normal               ED Course  ED Course as of Aug 15 1733   Sun Aug 15, 2021   1712 Low hemoglobin in the setting of upper GI bleeding is noted will start the patient on a Protonix infusion now Protonix bolus already given will also order 2 units packed red blood cells heart rate and blood pressure remained stable patient clinically and hemodynamically stable at this time will discuss with Gastroenterology on-call         [id number] Patient consented for blood transfusion  [address] with Dr Salvador Estrella gastroenterology on-call reviewed case and findings in the emergency department and management thus far he recommends admitting to the hospitalist service and keeping NPO for scope tomorrow  Agrees with management with Protonix drip and blood transfusions for now  [address] with Dr Celsa Andujar hospitalist on-call reviewed case and findings in the emergency department and management thus far recommendations of Gastroenterology she accepts for admission  HEART Risk Score      Most Recent Value   Heart Score Risk Calculator   History  0 Filed at: 08/15/2021 1732   ECG  0 Filed at: 08/15/2021 1732   Age  1 Filed at: 08/15/2021 1732   Risk Factors  2 Filed at: 08/15/2021 1732   Troponin  0 Filed at: 08/15/2021 1732   HEART Score  3 Filed at: 08/15/2021 1732                      SBIRT 20yo+      Most Recent Value   SBIRT (23 yo +)   In order to provide better care to our patients, we are screening all of our patients for alcohol and drug use  Would it be okay to ask you these screening questions?   No Filed at: 08/15/2021 1700                    MDM  Number of Diagnoses or Management Options  Abdominal discomfort, epigastric: new and requires workup  Anemia: new and requires workup  Chest discomfort: new and requires workup  Upper GI bleeding: new and requires workup     Amount and/or Complexity of Data Reviewed  Clinical lab tests: ordered and reviewed  Tests in the radiology section of CPT®: ordered and reviewed  Tests in the medicine section of CPT®: ordered and reviewed  Decide to obtain previous medical records or to obtain history from someone other than the patient: yes  Review and summarize past medical records: yes  Independent visualization of images, tracings, or specimens: yes    Risk of Complications, Morbidity, and/or Mortality  Presenting problems: moderate  Diagnostic procedures: moderate  Management options: moderate    Patient Progress  Patient progress: stable      Disposition  Final diagnoses:   Upper GI bleeding   Anemia   Chest discomfort   Abdominal discomfort, epigastric     Time reflects when diagnosis was documented in both MDM as applicable and the Disposition within this note     Time User Action Codes Description Comment    8/15/2021  5:19 PM [place] Saskia Add [K92 2] Upper GI bleeding     8/15/2021  5:19 PM [place] Add [D64 9] Anemia     8/15/2021  5:23 PM [place] Add [R07 89] Chest discomfort     8/15/2021  5:23 PM [place] Add [R10 13] Abdominal discomfort, epigastric       ED Disposition     ED Disposition Condition Date/Time Comment    Admit Stable Sun Aug 15, 2021  5:23 PM Case was discussed with Dr Ruben Carpenter and the patient's admission status was agreed to be Admission Status: inpatient status to the service of Dr Ruben Carpenter  Follow-up Information    None         Patient's Medications   Discharge Prescriptions    No medications on file     No discharge procedures on file      PDMP Review     None          ED Provider  Electronically Signed by           Roxy Ballard DO  08/15/21 [id number]

## 2021-08-15 NOTE — ASSESSMENT & PLAN NOTE
· Symptomatic with exertional shortness of breath for the past week now with epigastric pain and melena  Suspect secondary to upper GI bleed    See above NPO EGD tomorrow consultation to Gastroenterology Protonix drip has been started 2 units of PRBC transfusion has been done H&H q 6   · Hold Plavix  · In June was 15 hemoglobin

## 2021-08-15 NOTE — ASSESSMENT & PLAN NOTE
· Blood pressure is controlled in light of GI bleed with hemoglobin is 7  5    Will hold antihypertensive to avoid hypotension

## 2021-08-15 NOTE — H&P
[address]  H&P- Peola Brush 1960, 56 y o  male MRN: [id number]  Unit/Bed#: -Dene[facility] Encounter: [id number]  Primary Care Provider: Kevin Quinones DO   Date and time admitted to hospital: 8/15/2021  4:35 PM    * Acute GI bleeding  Assessment & Plan  · Gastric pain and melenic stools since yesterday  With acute anemia  ER discussed with the on-call GI NPO at midnight the EGD tomorrow  Patient is on Plavix with stroke in June will hold  · H&H Q 6  · 2 units of PRBC transfusion has been ordered  · Protonix drip has been ordered  · Place IV fluid    Dental abscess  Assessment & Plan  · Was started on clindamycin back in August 7 for 10 days will need to complete to August 17th will place him on IV light of NPO GI bleed    Hypokalemia  Assessment & Plan  · Replace    Transaminitis  Assessment & Plan  · In June he had negative LFTs he has been on Lipitor  Will hold Lipitor and see if it resolves  Alt > ast   - the they are not greater than 5 times normal okay to continue Lipitor recheck in the morning  GI already on consult    Acute blood loss anemia  Assessment & Plan  · Symptomatic with exertional shortness of breath for the past week now with epigastric pain and melena  Suspect secondary to upper GI bleed  See above NPO EGD tomorrow consultation to Gastroenterology Protonix drip has been started 2 units of PRBC transfusion has been done H&H q 6   · Hold Plavix  · In June was 15 hemoglobin    Prediabetes  Assessment & Plan  · Diagnosis last admission diet control    Essential hypertension  Assessment & Plan  · Blood pressure is controlled in light of GI bleed with hemoglobin is 7  5  Will hold antihypertensive to avoid hypotension    CVA (cerebral vascular accident) [facility])  Assessment & Plan  · May resume statin in light of GI bleed hold Plavix  Patient have stroke back in June he still has residual right-sided blurry vision    chest pain and exertional sob for past week- suspect 2/2 symptomatic anemia- first trop negative ekg none ischemic, transfuse and also trend tropoonins  VTE Pharmacologic Prophylaxis:   contraindicated due to bleed  Code Status: Prior   Discussion with family: Updated  (niece) at bedside  Anticipated Length of Stay: Patient will be admitted on an inpatient basis with an anticipated length of stay of greater than 2 midnights secondary to Acute GI bleed  Total Time for Visit, including Counseling / Coordination of Care: 60 minutes Greater than 50% of this total time spent on direct patient counseling and coordination of care  Chief Complaint:  The epigastric pain chest pain    History of Present Illness:  Balaji Mas is a 56 y o  male with a PMH of CVA back in June 2021 who presents with 1 day of epigastric pain radiating up to chest pain and also melenic stools associated with diarrhea no foul odor  He has been taking clindamycin since August 7 for his tooth abscess  He had a recent colonoscopy which is essentially negative  Never had an EGD never had a upper GI bleed with melenic stools  He denies taking any Motrin or Advil when I do see that he was prescribed Toradol p o  When he was in the ER August 7th with dental abscess  Patient is on Plavix he had a stroke back in June 2021 completed 21 days of aspirin and Plavix now on Plavix and past week associated dizziness exertional shortness of breath  Denies any nausea vomiting    Review of Systems:  Review of Systems   Constitutional: Negative for chills and fever  HENT: Negative for ear pain and sore throat  Eyes: Negative for pain and visual disturbance  Respiratory: Negative for cough and shortness of breath  Cardiovascular: Positive for chest pain  Negative for palpitations  Gastrointestinal: Positive for abdominal pain  Negative for vomiting  Genitourinary: Negative for dysuria and hematuria  Musculoskeletal: Negative for arthralgias and back pain     Skin: Negative for color change and rash  Neurological: Negative for seizures and syncope  All other systems reviewed and are negative  Past Medical and Surgical History:   Past Medical History:   Diagnosis Date    History of high cholesterol     Hypertension     Stroke [facility])        Past Surgical History:   Procedure Laterality Date    HERNIA REPAIR         Meds/Allergies:  Prior to Admission medications    Medication Sig Start Date End Date Taking? Authorizing Provider   amoxicillin-clavulanate (AUGMENTIN) 875-125 mg per tablet Take 1 tablet by mouth 2 (two) times a day 8/5/21 8/15/21 Yes Historical Provider, MD   atorvastatin (LIPITOR) 40 mg tablet Take 1 tablet (40 mg total) by mouth daily with dinner 6/2/21  Yes Kenrick Kahn MD   clopidogrel (PLAVIX) 75 mg tablet Take 1 tablet (75 mg total) by mouth daily 6/3/21 8/15/21 Yes Kenrick Kahn MD   hydrochlorothiazide (HYDRODIURIL) 12 5 mg tablet Take 12 5 mg by mouth daily 8/5/21 9/4/21 Yes Historical Provider, MD   ketorolac (TORADOL) 10 mg tablet Take 1 tablet (10 mg total) by mouth every 6 (six) hours as needed for mild pain or moderate pain 8/7/21  Yes Meg Velasquez DO   lisinopril (ZESTRIL) 10 mg tablet Take 1 tablet (10 mg total) by mouth daily 6/2/21  Yes Kenrick Kahn MD   aspirin (Aspirin 81) 81 mg chewable tablet Chew 1 tablet (81 mg total) daily for 21 days 6/2/21 8/15/21  Kenrick Kahn MD   clindamycin (CLEOCIN) 300 MG capsule Take 1 capsule (300 mg total) by mouth every 6 (six) hours for 7 days 8/7/21 8/15/21  Meg Velasquez DO     I have reviewed home medications using recent Epic encounter      Allergies: No Known Allergies    Social History:  Marital Status: Single   Substance Use History:   Social History     Substance and Sexual Activity   Alcohol Use Yes     Social History     Tobacco Use   Smoking Status Never Smoker   Smokeless Tobacco Never Used     Social History     Substance and Sexual Activity   Drug Use Never Family History:  History reviewed  No pertinent family history  Physical Exam:     Vitals:   Blood Pressure: 126/81 (08/15/21 1745)  Pulse: 102 (08/15/21 1745)  Temperature: 98 1 °F (36 7 °C) (08/15/21 1724)  Temp Source: Temporal (08/15/21 1724)  Respirations: 21 (08/15/21 1745)  Weight - Scale: 89 2 kg (196 lb 10 4 oz) (08/15/21 1641)  SpO2: 97 % (08/15/21 1745)    Physical Exam  Vitals and nursing note reviewed  Constitutional:       Appearance: He is well-developed  HENT:      Head: Normocephalic and atraumatic  Eyes:      Conjunctiva/sclera: Conjunctivae normal    Cardiovascular:      Rate and Rhythm: Normal rate and regular rhythm  Heart sounds: No murmur heard  Pulmonary:      Effort: Pulmonary effort is normal  No respiratory distress  Breath sounds: Normal breath sounds  Abdominal:      Palpations: Abdomen is soft  Tenderness: There is abdominal tenderness (Tenderness in epigastric)  Musculoskeletal:      Cervical back: Neck supple  Skin:     General: Skin is warm and dry  Neurological:      General: No focal deficit present  Mental Status: He is alert and oriented to person, place, and time  Mental status is at baseline     Psychiatric:         Mood and Affect: Mood normal          Additional Data:     Lab Results:  Results from last 7 days   Lab Units 08/15/21  1652   WBC Thousand/uL 10 39*   HEMOGLOBIN g/dL 7 6*   HEMATOCRIT % 22 7*   PLATELETS Thousands/uL 286   NEUTROS PCT % 72   LYMPHS PCT % 17   MONOS PCT % 7   EOS PCT % 2     Results from last 7 days   Lab Units 08/15/21  1652   SODIUM mmol/L 139   POTASSIUM mmol/L 3 4*   CHLORIDE mmol/L 104   CO2 mmol/L 26   BUN mg/dL 23   CREATININE mg/dL 1 09   ANION GAP mmol/L 9   CALCIUM mg/dL 8 3   ALBUMIN g/dL 3 0*   TOTAL BILIRUBIN mg/dL 0 22   ALK PHOS U/L 82   ALT U/L 120*   AST U/L 52*   GLUCOSE RANDOM mg/dL 146*     Results from last 7 days   Lab Units 08/15/21  1652   INR  0 99                   Imaging: Reviewed radiology reports from this admission including: chest xray  XR chest 1 view portable   ED Interpretation by Ty Horne DO (08/15 1721)   No acute cardiopulmonary disease          EKG and Other Studies Reviewed on Admission:   · EKG: Sinus Tachycardia    ** Please Note: This note has been constructed using a voice recognition system   **

## 2021-08-15 NOTE — ASSESSMENT & PLAN NOTE
· Was started on clindamycin back in August 7 for 10 days will need to complete to August 17th will place him on IV light of NPO GI bleed

## 2021-08-15 NOTE — ASSESSMENT & PLAN NOTE
· In June he had negative LFTs he has been on Lipitor  Will hold Lipitor and see if it resolves  Alt > ast   - the they are not greater than 5 times normal okay to continue Lipitor recheck in the morning    GI already on consult

## 2021-08-15 NOTE — ASSESSMENT & PLAN NOTE
· May resume statin in light of GI bleed hold Plavix  Patient have stroke back in June he still has residual right-sided blurry vision

## 2021-08-16 ENCOUNTER — TELEPHONE (OUTPATIENT)
Dept: SURGERY | Facility: HOSPITAL | Age: 61
End: 2021-08-16

## 2021-08-16 ENCOUNTER — ANESTHESIA EVENT (INPATIENT)
Dept: GASTROENTEROLOGY | Facility: HOSPITAL | Age: 61
DRG: 254 | End: 2021-08-16
Payer: COMMERCIAL

## 2021-08-16 ENCOUNTER — ANESTHESIA (INPATIENT)
Dept: GASTROENTEROLOGY | Facility: HOSPITAL | Age: 61
DRG: 254 | End: 2021-08-16
Payer: COMMERCIAL

## 2021-08-16 ENCOUNTER — APPOINTMENT (INPATIENT)
Dept: GASTROENTEROLOGY | Facility: HOSPITAL | Age: 61
DRG: 254 | End: 2021-08-16
Attending: INTERNAL MEDICINE
Payer: COMMERCIAL

## 2021-08-16 PROBLEM — R79.89 TROPONIN LEVEL ELEVATED: Status: ACTIVE | Noted: 2021-08-16

## 2021-08-16 PROBLEM — Z01.818 PRE-OP EXAM: Status: ACTIVE | Noted: 2021-08-16

## 2021-08-16 PROBLEM — R77.8 TROPONIN LEVEL ELEVATED: Status: ACTIVE | Noted: 2021-08-16

## 2021-08-16 LAB
ABO GROUP BLD BPU: NORMAL
ABO GROUP BLD BPU: NORMAL
ALBUMIN SERPL BCP-MCNC: 2.9 G/DL (ref 3.5–5)
ALP SERPL-CCNC: 65 U/L (ref 46–116)
ALT SERPL W P-5'-P-CCNC: 102 U/L (ref 12–78)
ANION GAP SERPL CALCULATED.3IONS-SCNC: 9 MMOL/L (ref 4–13)
AST SERPL W P-5'-P-CCNC: 39 U/L (ref 5–45)
BASOPHILS # BLD AUTO: 0.03 THOUSANDS/ΜL (ref 0–0.1)
BASOPHILS NFR BLD AUTO: 0 % (ref 0–1)
BILIRUB SERPL-MCNC: 0.41 MG/DL (ref 0.2–1)
BPU ID: NORMAL
BPU ID: NORMAL
BUN SERPL-MCNC: 16 MG/DL (ref 5–25)
CALCIUM ALBUM COR SERPL-MCNC: 8.9 MG/DL (ref 8.3–10.1)
CALCIUM SERPL-MCNC: 8 MG/DL (ref 8.3–10.1)
CHLORIDE SERPL-SCNC: 105 MMOL/L (ref 100–108)
CO2 SERPL-SCNC: 24 MMOL/L (ref 21–32)
CREAT SERPL-MCNC: 0.94 MG/DL (ref 0.6–1.3)
CROSSMATCH: NORMAL
CROSSMATCH: NORMAL
EOSINOPHIL # BLD AUTO: 0.2 THOUSAND/ΜL (ref 0–0.61)
EOSINOPHIL NFR BLD AUTO: 3 % (ref 0–6)
ERYTHROCYTE [DISTWIDTH] IN BLOOD BY AUTOMATED COUNT: 14.1 % (ref 11.6–15.1)
FERRITIN SERPL-MCNC: 28 NG/ML (ref 8–388)
GFR SERPL CREATININE-BSD FRML MDRD: 87 ML/MIN/1.73SQ M
GLUCOSE SERPL-MCNC: 112 MG/DL (ref 65–140)
HAV IGM SER QL: NORMAL
HBV CORE IGM SER QL: NORMAL
HBV SURFACE AG SER QL: NORMAL
HCT VFR BLD AUTO: 25.9 % (ref 36.5–49.3)
HCT VFR BLD AUTO: 27.5 % (ref 36.5–49.3)
HCV AB SER QL: NORMAL
HGB BLD-MCNC: 8.5 G/DL (ref 12–17)
HGB BLD-MCNC: 8.9 G/DL (ref 12–17)
HGB BLD-MCNC: 9.2 G/DL (ref 12–17)
IMM GRANULOCYTES # BLD AUTO: 0.08 THOUSAND/UL (ref 0–0.2)
IMM GRANULOCYTES NFR BLD AUTO: 1 % (ref 0–2)
IRON SATN MFR SERPL: 7 %
IRON SERPL-MCNC: 25 UG/DL (ref 65–175)
LYMPHOCYTES # BLD AUTO: 1.41 THOUSANDS/ΜL (ref 0.6–4.47)
LYMPHOCYTES NFR BLD AUTO: 17 % (ref 14–44)
MCH RBC QN AUTO: 30.2 PG (ref 26.8–34.3)
MCHC RBC AUTO-ENTMCNC: 34.4 G/DL (ref 31.4–37.4)
MCV RBC AUTO: 88 FL (ref 82–98)
MONOCYTES # BLD AUTO: 0.71 THOUSAND/ΜL (ref 0.17–1.22)
MONOCYTES NFR BLD AUTO: 9 % (ref 4–12)
NEUTROPHILS # BLD AUTO: 5.73 THOUSANDS/ΜL (ref 1.85–7.62)
NEUTS SEG NFR BLD AUTO: 70 % (ref 43–75)
NRBC BLD AUTO-RTO: 0 /100 WBCS
PLATELET # BLD AUTO: 217 THOUSANDS/UL (ref 149–390)
PMV BLD AUTO: 9.8 FL (ref 8.9–12.7)
POTASSIUM SERPL-SCNC: 3.7 MMOL/L (ref 3.5–5.3)
PROT SERPL-MCNC: 6.1 G/DL (ref 6.4–8.2)
RBC # BLD AUTO: 2.95 MILLION/UL (ref 3.88–5.62)
SODIUM SERPL-SCNC: 138 MMOL/L (ref 136–145)
TIBC SERPL-MCNC: 380 UG/DL (ref 250–450)
TROPONIN I SERPL-MCNC: 0.1 NG/ML
TROPONIN I SERPL-MCNC: 0.12 NG/ML
TROPONIN I SERPL-MCNC: 0.18 NG/ML
UNIT DISPENSE STATUS: NORMAL
UNIT DISPENSE STATUS: NORMAL
UNIT PRODUCT CODE: NORMAL
UNIT PRODUCT CODE: NORMAL
UNIT PRODUCT VOLUME: 350 ML
UNIT PRODUCT VOLUME: 350 ML
UNIT RH: NORMAL
UNIT RH: NORMAL
WBC # BLD AUTO: 8.16 THOUSAND/UL (ref 4.31–10.16)

## 2021-08-16 PROCEDURE — 80074 ACUTE HEPATITIS PANEL: CPT | Performed by: FAMILY MEDICINE

## 2021-08-16 PROCEDURE — 30233N1 TRANSFUSION OF NONAUTOLOGOUS RED BLOOD CELLS INTO PERIPHERAL VEIN, PERCUTANEOUS APPROACH: ICD-10-PCS | Performed by: FAMILY MEDICINE

## 2021-08-16 PROCEDURE — 85018 HEMOGLOBIN: CPT | Performed by: FAMILY MEDICINE

## 2021-08-16 PROCEDURE — 0DB68ZX EXCISION OF STOMACH, VIA NATURAL OR ARTIFICIAL OPENING ENDOSCOPIC, DIAGNOSTIC: ICD-10-PCS | Performed by: INTERNAL MEDICINE

## 2021-08-16 PROCEDURE — 80053 COMPREHEN METABOLIC PANEL: CPT | Performed by: FAMILY MEDICINE

## 2021-08-16 PROCEDURE — 88305 TISSUE EXAM BY PATHOLOGIST: CPT | Performed by: PATHOLOGY

## 2021-08-16 PROCEDURE — 85014 HEMATOCRIT: CPT | Performed by: FAMILY MEDICINE

## 2021-08-16 PROCEDURE — C9113 INJ PANTOPRAZOLE SODIUM, VIA: HCPCS | Performed by: EMERGENCY MEDICINE

## 2021-08-16 PROCEDURE — 84484 ASSAY OF TROPONIN QUANT: CPT | Performed by: EMERGENCY MEDICINE

## 2021-08-16 PROCEDURE — 84484 ASSAY OF TROPONIN QUANT: CPT | Performed by: FAMILY MEDICINE

## 2021-08-16 PROCEDURE — 99232 SBSQ HOSP IP/OBS MODERATE 35: CPT | Performed by: FAMILY MEDICINE

## 2021-08-16 PROCEDURE — 85025 COMPLETE CBC W/AUTO DIFF WBC: CPT | Performed by: FAMILY MEDICINE

## 2021-08-16 PROCEDURE — 99255 IP/OBS CONSLTJ NEW/EST HI 80: CPT | Performed by: INTERNAL MEDICINE

## 2021-08-16 RX ORDER — PANTOPRAZOLE SODIUM 40 MG/1
40 TABLET, DELAYED RELEASE ORAL
Status: DISCONTINUED | OUTPATIENT
Start: 2021-08-17 | End: 2021-08-18 | Stop reason: HOSPADM

## 2021-08-16 RX ORDER — LIDOCAINE HYDROCHLORIDE 20 MG/ML
INJECTION, SOLUTION EPIDURAL; INFILTRATION; INTRACAUDAL; PERINEURAL AS NEEDED
Status: DISCONTINUED | OUTPATIENT
Start: 2021-08-16 | End: 2021-08-16

## 2021-08-16 RX ORDER — PROPOFOL 10 MG/ML
INJECTION, EMULSION INTRAVENOUS AS NEEDED
Status: DISCONTINUED | OUTPATIENT
Start: 2021-08-16 | End: 2021-08-16

## 2021-08-16 RX ORDER — SODIUM CHLORIDE, SODIUM LACTATE, POTASSIUM CHLORIDE, CALCIUM CHLORIDE 600; 310; 30; 20 MG/100ML; MG/100ML; MG/100ML; MG/100ML
INJECTION, SOLUTION INTRAVENOUS CONTINUOUS PRN
Status: DISCONTINUED | OUTPATIENT
Start: 2021-08-16 | End: 2021-08-16

## 2021-08-16 RX ADMIN — LIDOCAINE HYDROCHLORIDE 100 MG: 20 INJECTION, SOLUTION EPIDURAL; INFILTRATION; INTRACAUDAL; PERINEURAL at 14:46

## 2021-08-16 RX ADMIN — CLINDAMYCIN IN 5 PERCENT DEXTROSE 600 MG: 12 INJECTION, SOLUTION INTRAVENOUS at 04:52

## 2021-08-16 RX ADMIN — CLINDAMYCIN IN 5 PERCENT DEXTROSE 600 MG: 12 INJECTION, SOLUTION INTRAVENOUS at 20:59

## 2021-08-16 RX ADMIN — PANTOPRAZOLE SODIUM 8 MG/HR: 40 INJECTION, POWDER, FOR SOLUTION INTRAVENOUS at 04:48

## 2021-08-16 RX ADMIN — ONDANSETRON 4 MG: 2 INJECTION INTRAMUSCULAR; INTRAVENOUS at 21:58

## 2021-08-16 RX ADMIN — BISACODYL 20 MG: 5 TABLET, COATED ORAL at 16:53

## 2021-08-16 RX ADMIN — SODIUM CHLORIDE, SODIUM GLUCONATE, SODIUM ACETATE, POTASSIUM CHLORIDE, MAGNESIUM CHLORIDE, SODIUM PHOSPHATE, DIBASIC, AND POTASSIUM PHOSPHATE 125 ML/HR: .53; .5; .37; .037; .03; .012; .00082 INJECTION, SOLUTION INTRAVENOUS at 09:04

## 2021-08-16 RX ADMIN — ATORVASTATIN CALCIUM 40 MG: 40 TABLET, FILM COATED ORAL at 16:45

## 2021-08-16 RX ADMIN — PROPOFOL 100 MG: 10 INJECTION, EMULSION INTRAVENOUS at 14:46

## 2021-08-16 RX ADMIN — SODIUM CHLORIDE, SODIUM LACTATE, POTASSIUM CHLORIDE, AND CALCIUM CHLORIDE: .6; .31; .03; .02 INJECTION, SOLUTION INTRAVENOUS at 14:30

## 2021-08-16 RX ADMIN — POLYETHYLENE GLYCOL 3350, SODIUM SULFATE ANHYDROUS, SODIUM BICARBONATE, SODIUM CHLORIDE, POTASSIUM CHLORIDE 4000 ML: 236; 22.74; 6.74; 5.86; 2.97 POWDER, FOR SOLUTION ORAL at 18:04

## 2021-08-16 RX ADMIN — PROPOFOL 50 MG: 10 INJECTION, EMULSION INTRAVENOUS at 14:50

## 2021-08-16 RX ADMIN — PROPOFOL 50 MG: 10 INJECTION, EMULSION INTRAVENOUS at 14:47

## 2021-08-16 RX ADMIN — CLINDAMYCIN IN 5 PERCENT DEXTROSE 600 MG: 12 INJECTION, SOLUTION INTRAVENOUS at 16:45

## 2021-08-16 NOTE — ASSESSMENT & PLAN NOTE
Pt with RCRI of 1 with 6% risk of death, MI, or cardiac arrest   Able to perform > 4 METs of activity  Troponin elevation appears to be in the setting of demand given symptomatic anemia  No symptoms consistent with angina prior to today  Recommend continuing telemetry monitoring until discharge  OK to continue with further GI evaluation (needs colonoscopy today)

## 2021-08-16 NOTE — ASSESSMENT & PLAN NOTE
· Gastric pain and melenic stools since yesterday  With acute anemia  ER discussed with the on-call GI NPO at midnight the EGD tomorrow  Patient is on Plavix with stroke in June will hold  · H&H Q 6  · 2 units of PRBC transfusion has been ordered  · Protonix drip has been ordered  · Place IV fluid but decreased  · Hemoglobin improved after 2 units discontinue H&H Q 6   Will plan for q 12  · Transfuse if less than 8  · As soon as cleared by cardiology will proceed with EGD as discussed with GI

## 2021-08-16 NOTE — UTILIZATION REVIEW
Initial Clinical Review    Admission: Date/Time/Statement:   Admission Orders (From admission, onward)     Ordered        08/15/21 1730  INPATIENT ADMISSION  Once                   Orders Placed This Encounter   Procedures    INPATIENT ADMISSION     Standing Status:   Standing     Number of Occurrences:   1     Order Specific Question:   Level of Care     Answer:   Med Surg [16]     Order Specific Question:   Estimated length of stay     Answer:   More than 2 Midnights     Order Specific Question:   Certification     Answer:   I certify that inpatient services are medically necessary for this patient for a duration of greater than two midnights  See H&P and MD Progress Notes for additional information about the patient's course of treatment  ED Arrival Information     Expected Arrival Acuity    - 8/15/2021 16:33 Emergent         Means of arrival Escorted by Service Admission type    [facility] General Medicine Emergency         Arrival complaint    chest pain        Chief Complaint   Patient presents with    Chest Pain     pt states he has been having black diarrhea for the past week  on plavix  started having chest pressure/pain yesterday  denies N/V, dizziness       Initial Presentation: 56year old male to the ED from home with complaints of chest pain, epigastric abdominal pain, black stools for 3-4 days  Admitted to inpatient for  Acute GI bleed, dental abscess, acute blood loss anemia  REcent CVA on Plavix and aspirin  Seen in ED for dental infection, followed up with oral surgeon, started on po abx  Continue  Arrives tachycardic with guiac + stools, abdominal and epigastric tenderness  Dark black stools  Found to have low hemoglobin  Started on IV protonix  NPO  Given 2 units PRBCs  GI consult  IV fluids  HOld LIpitor for elevated LFTs  Date: 8/16   Day 2: Check H/H Every 6 hours  Hgb improved after 2 units PRBCS  Will need cardiology clearance prior to EGD   Elevated troponin with no chest pain  Likely due to demand ischemia due to anemia  GI consult:  Suspect PUD in setting of NSAID use while on plavix  Continue with protonix infusion  Avoid NSAIDS  Keep NPO  Elevated liver enzymes could be due to ischemic hepatitis  Given heavy alcohol use history, may contribute  Cardiology consult: Pt with RCRI of 1 with 6% risk of death, MI, or cardiac arrest Troponin elevation appears to be in the setting of demand given symptomatic anemia  Okay to proceed with GI eval  Monitor tele     ED Triage Vitals   Temperature Pulse Respirations Blood Pressure SpO2   08/15/21 1724 08/15/21 1641 08/15/21 1641 08/15/21 1641 08/15/21 1641   98 1 °F (36 7 °C) 103 20 113/67 99 %      Temp Source Heart Rate Source Patient Position - Orthostatic VS BP Location FiO2 (%)   08/15/21 1724 08/15/21 1641 08/15/21 1641 08/15/21 1641 --   Temporal Monitor Lying Right arm       Pain Score       08/15/21 1641       1          Wt Readings from Last 1 Encounters:   08/15/21 89 2 kg (196 lb 10 4 oz)     Additional Vital Signs:   /Time  Temp Pulse Resp  BP  MAP (mmHg)  SpO2  O2 Device Patient Position - Orthostatic VS   08/16/21 1418  98 3 °F (36 8 °C) 79 16  128/81  --  97 %  None (Room air) --   08/16/21 06:56:41  98 6 °F (37 °C) 74 17  114/78  90  98 %  -- --   08/16/21 01:41:19  98 5 °F (36 9 °C) 75 18  115/78  90  95 %  None (Room air) Lying   08/15/21 23:56:38  98 9 °F (37 2 °C) 84 18  126/84  98  95 %  None (Room air) Lying   08/15/21 23:41:02  98 3 °F (36 8 °C) 93 18  128/84  99  95 %  None (Room air) Lying   08/15/21 23:10:35  98 5 °F (36 9 °C) 82 18  119/80  93  94 %  None (Room air) Lying   08/15/21 21:10:41  98 9 °F (37 2 °C) 78 18  119/82  94  95 %  None (Room air) Lying   08/15/21 20:26:34  99 °F (37 2 °C) 77 18  118/81  93  97 %  None (Room air) Lying   08/15/21 19:20:14  98 5 °F (36 9 °C) 80 18  116/80  92  96 %  None (Room air) Lying   08/15/21 18:39:33  98 9 °F (37 2 °C) 83 18  117/80  92  95 %  None (Room air) Lying   08/15/21 18:08:15  99 2 °F (37 3 °C) 94 19  117/82  94  95 %  -- --   08/15/21 1745  -- 102 21  126/81  98  97 %  -- --   08/15/21 1730  -- 104 24Abnormal   127/72  93  96 %  -- --   08/15/21 1724  98 1 °F (36 7 °C) -- --  --  --  --  -- --   08/15/21 1700  -- -- --  --  --  --  None (Room air) --   08/15/21 1641  -- 103 20  113/67  --  99 %  None (Room air) Lying       Pertinent Labs/Diagnostic Test Results:   8/15 EKG: Quality:     Tracing quality:  Limited by artifact   Rate:     ECG rate:  103     ECG rate assessment: tachycardic     Rhythm:     Rhythm: sinus tachycardia     QRS:     QRS axis:  Normal     QRS intervals:  Normal   ST segments:     ST segments:  Normal   T waves:     T waves: normal    8/15 CXR: No acute cardiopulmonary disease         Results from last 7 days   Lab Units 08/16/21  0522 08/16/21  0000 08/15/21  1652   WBC Thousand/uL 8 16  --  10 39*   HEMOGLOBIN g/dL 8 9* 8 5* 7 6*   HEMATOCRIT % 25 9*  --  22 7*   PLATELETS Thousands/uL 217  --  286   NEUTROS ABS Thousands/µL 5 73  --  7 53         Results from last 7 days   Lab Units 08/16/21  0522 08/15/21  1652   SODIUM mmol/L 138 139   POTASSIUM mmol/L 3 7 3 4*   CHLORIDE mmol/L 105 104   CO2 mmol/L 24 26   ANION GAP mmol/L 9 9   BUN mg/dL 16 23   CREATININE mg/dL 0 94 1 09   EGFR ml/min/1 73sq m 87 72   CALCIUM mg/dL 8 0* 8 3     Results from last 7 days   Lab Units 08/16/21  0522 08/15/21  1652   AST U/L 39 52*   ALT U/L 102* 120*   ALK PHOS U/L 65 82   TOTAL PROTEIN g/dL 6 1* 6 5   ALBUMIN g/dL 2 9* 3 0*   TOTAL BILIRUBIN mg/dL 0 41 0 22         Results from last 7 days   Lab Units 08/16/21  0522 08/15/21  1652   GLUCOSE RANDOM mg/dL 112 146*         Results from last 7 days   Lab Units 08/16/21  1131 08/16/21  0835 08/16/21  0000 08/15/21  1841 08/15/21  1652   TROPONIN I ng/mL 0 10* 0 12* 0 18* 0 06* <0 02         Results from last 7 days   Lab Units 08/15/21  1652   PROTIME seconds 12 9   INR  0 99   PTT seconds 30 Results from last 7 days   Lab Units 08/15/21  1652   NT-PRO BNP pg/mL 98     Results from last 7 days   Lab Units 08/15/21  1841   FERRITIN ng/mL 28     Results from last 7 days   Lab Units 08/15/21  1652   LIPASE u/L 178     ED Treatment:   Medication Administration from 08/15/2021 1632 to 08/15/2021 1803       Date/Time Order Dose Route Action     08/15/2021 1656 pantoprazole (PROTONIX) injection 40 mg 40 mg Intravenous Given     08/15/2021 1745 pantoprazole (PROTONIX) 80 mg in sodium chloride 0 9 % 100 mL infusion 8 mg/hr Intravenous New Bag        Past Medical History:   Diagnosis Date    History of high cholesterol     Hypertension     Impaired glucose tolerance     Stroke [facility])        Admitting Diagnosis: Chest discomfort [R07 89]  Chest pain [R07 9]  Upper GI bleeding [K92 2]  Anemia [D64 9]  Abdominal discomfort, epigastric [R10 13]  Age/Sex: 56 y o  male  Admission Orders:  TEle   I/O  SCDS  CBC, H/H every 12 hours   hepatitis panel  NPO    Scheduled Medications:  atorvastatin, 40 mg, Oral, Daily With Dinner  clindamycin, 600 mg, Intravenous, Q8H      Continuous IV Infusions:  multi-electrolyte, 100 mL/hr, Intravenous, Continuous  pantoprozole (PROTONIX) infusion (Continuous), 8 mg/hr, Intravenous, Continuous      PRN Meds:  acetaminophen, 650 mg, Oral, Q6H PRN  ondansetron, 4 mg, Intravenous, Q6H PRN  pneumococcal 13-valent conjugate vaccine, 0 5 mL, Intramuscular, Prior to discharge        IP CONSULT TO GASTROENTEROLOGY  IP CONSULT TO CARDIOLOGY    Network Utilization Review Department  ATTENTION: Please call with any questions or concerns to [phone] and carefully listen to the prompts so that you are directed to the right person  All voicemails are confidential   Adriana Rodriguez all requests for admission clinical reviews, approved or denied determinations and any other requests to dedicated fax number below belonging to the campus where the patient is receiving treatment   List of dedicated fax numbers for the Facilities:  [address] DENIALS (Administrative/Medical Necessity) [phone]   [address]  (Maternity/NICU/Pediatrics) 261 [facility] Kal Dominique [address] [id number] [address] Oswaldo Rice [address] [facility] [place] [address] [phone]

## 2021-08-16 NOTE — ASSESSMENT & PLAN NOTE
Pt with history of cryogenic stroke in 5/2021  He is scheduled for outpatient consult with cardiology on 8/25/21 to discuss placement of loop recorder to evaluate for possible presence of atrial fibrillation  Patient has risk factors for atrial fibrillation including moderate EtOH use  Plavix 75mg daily on hold due to GI bleed  Pt reported tarry stool and symptomatic anemia on presentation, currently undergoing GI work up  Continue Atorvastatin 40mg daily  Goal LDL < 100  Will arrange loop recorder placement when seen in office

## 2021-08-16 NOTE — ASSESSMENT & PLAN NOTE
Pt reports onset of tarry stools last week, then development of shortness of breath on exertion with lightheadedness over the weekend  Upon admission found to have H/H: 7 6/22 7  He reports complete resolution of symptoms following administration of 2 units PRBC  Status post EGD 8/16/2021 which showed erosive gastritis which was not felt to be the source orf bleeding/anemia  Plan for colonoscopy today     Hg 9 6 this am

## 2021-08-16 NOTE — ASSESSMENT & PLAN NOTE
Troponin levels 0 02 ->0 06->0 18->0 12->0 10  Demand in the setting of acute GI bleed with hemoglobin of 7 6 on admission  Pt reports no chest pain  He was experiencing shortness of breath and lightheadedness this weekend which resolved following 2 units of PRBC  Troponin peak of 0 18 now with trend down  Continue telemetry until discharge  Needs close outpatient cardiology follow up

## 2021-08-16 NOTE — ASSESSMENT & PLAN NOTE
Continue Atorvastatin 40mg daily  Plan for updated lipid panel in the outpatient setting  Goal LDL < 70 given history of CVA and now with troponin elevation

## 2021-08-16 NOTE — ASSESSMENT & PLAN NOTE
· Was started on clindamycin back in August 7 for 10 days will need to complete to August 17th will place him on IV light of NPO GI bleed patient is to follow-up with dentist

## 2021-08-16 NOTE — ASSESSMENT & PLAN NOTE
· Symptomatic with exertional shortness of breath for the past week now with epigastric pain and melena  Suspect secondary to upper GI bleed    See above NPO EGD  consultation to Gastroenterology Protonix drip has been started 2 units of PRBC transfusion has been done H&H q  Switched to q 12 it improved to 8 9  · Hold Plavix  · In June was 15 hemoglobin  · Should undergo EGD as soon as cleared  · Iron panel iron deficiency anemia ferritin of 28

## 2021-08-16 NOTE — PLAN OF CARE
Problem: Potential for Falls  Goal: Patient will remain free of falls  Description: INTERVENTIONS:  - Educate patient/family on patient safety including physical limitations  - Instruct patient to call for assistance with activity   - Consult OT/PT to assist with strengthening/mobility   - Keep Call bell within reach  - Keep bed low and locked with side rails adjusted as appropriate  - Keep care items and personal belongings within reach  - Initiate and maintain comfort rounds  - Make Fall Risk Sign visible to staff  - Offer Toileting every 2 Hours, in advance of need  - Apply yellow socks and bracelet for high fall risk patients  - Consider moving patient to room near nurses station  Outcome: Progressing     Problem: GASTROINTESTINAL - ADULT  Goal: Minimal or absence of nausea and/or vomiting  Description: INTERVENTIONS:  - Administer IV fluids if ordered to ensure adequate hydration  - Maintain NPO status until nausea and vomiting are resolved  - Nasogastric tube if ordered  - Administer ordered antiemetic medications as needed  - Provide nonpharmacologic comfort measures as appropriate  - Advance diet as tolerated, if ordered  - Consider nutrition services referral to assist patient with adequate nutrition and appropriate food choices  Outcome: Progressing  Goal: Maintains or returns to baseline bowel function  Description: INTERVENTIONS:  - Assess bowel function  - Encourage oral fluids to ensure adequate hydration  - Administer IV fluids if ordered to ensure adequate hydration  - Administer ordered medications as needed  - Encourage mobilization and activity  - Consider nutritional services referral to assist patient with adequate nutrition and appropriate food choices  Outcome: Progressing  Goal: Maintains adequate nutritional intake  Description: INTERVENTIONS:  - Monitor percentage of each meal consumed  - Identify factors contributing to decreased intake, treat as appropriate  - Assist with meals as needed  - Monitor I&O, weight, and lab values if indicated  - Obtain nutrition services referral as needed  Outcome: Progressing     Problem: METABOLIC, FLUID AND ELECTROLYTES - ADULT  Goal: Electrolytes maintained within normal limits  Description: INTERVENTIONS:  - Monitor labs and assess patient for signs and symptoms of electrolyte imbalances  - Administer electrolyte replacement as ordered  - Monitor response to electrolyte replacements, including repeat lab results as appropriate  - Instruct patient on fluid and nutrition as appropriate  Outcome: Progressing  Goal: Fluid balance maintained  Description: INTERVENTIONS:  - Monitor labs   - Monitor I/O and WT  - Instruct patient on fluid and nutrition as appropriate  - Assess for signs & symptoms of volume excess or deficit  Outcome: Progressing     Problem: HEMATOLOGIC - ADULT  Goal: Maintains hematologic stability  Description: INTERVENTIONS  - Assess for signs and symptoms of bleeding or hemorrhage  - Monitor labs  - Administer supportive blood products/factors as ordered and appropriate  Outcome: Progressing     Problem: PAIN - ADULT  Goal: Verbalizes/displays adequate comfort level or baseline comfort level  Description: Interventions:  - Encourage patient to monitor pain and request assistance  - Assess pain using appropriate pain scale  - Administer analgesics based on type and severity of pain and evaluate response  - Implement non-pharmacological measures as appropriate and evaluate response  - Consider cultural and social influences on pain and pain management  - Notify physician/advanced practitioner if interventions unsuccessful or patient reports new pain  Outcome: Progressing     Problem: INFECTION - ADULT  Goal: Absence or prevention of progression during hospitalization  Description: INTERVENTIONS:  - Assess and monitor for signs and symptoms of infection  - Monitor lab/diagnostic results  - Monitor all insertion sites, i e  indwelling lines, tubes, and drains  - Monitor endotracheal if appropriate and nasal secretions for changes in amount and color  - [place] appropriate cooling/warming therapies per order  - Administer medications as ordered  - Instruct and encourage patient and family to use good hand hygiene technique  - Identify and instruct in appropriate isolation precautions for identified infection/condition  Outcome: Progressing  Goal: Absence of fever/infection during neutropenic period  Description: INTERVENTIONS:  - Monitor WBC    Outcome: Progressing     Problem: SAFETY ADULT  Goal: Patient will remain free of falls  Description: INTERVENTIONS:  - Educate patient/family on patient safety including physical limitations  - Instruct patient to call for assistance with activity   - Consult OT/PT to assist with strengthening/mobility   - Keep Call bell within reach  - Keep bed low and locked with side rails adjusted as appropriate  - Keep care items and personal belongings within reach  - Initiate and maintain comfort rounds  - Make Fall Risk Sign visible to staff  - Offer Toileting every 2 Hours, in advance of need  - Apply yellow socks and bracelet for high fall risk patients  - Consider moving patient to room near nurses station  Outcome: Progressing  Goal: Maintain or return to baseline ADL function  Description: INTERVENTIONS:  -  Assess patient's ability to carry out ADLs; assess patient's baseline for ADL function and identify physical deficits which impact ability to perform ADLs (bathing, care of mouth/teeth, toileting, grooming, dressing, etc )  - Assess/evaluate cause of self-care deficits   - Assess range of motion  - Assess patient's mobility; develop plan if impaired  - Assess patient's need for assistive devices and provide as appropriate  - Encourage maximum independence but intervene and supervise when necessary  - Involve family in performance of ADLs  - Assess for home care needs following discharge   - Consider OT consult to assist with ADL evaluation and planning for discharge  - Provide patient education as appropriate  Outcome: Progressing  Goal: Maintains/Returns to pre admission functional level  Description: INTERVENTIONS:  - Perform BMAT or MOVE assessment daily    - Set and communicate daily mobility goal to care team and patient/family/caregiver  - Collaborate with rehabilitation services on mobility goals if consulted  - Out of bed for toileting  - Record patient progress and toleration of activity level   Outcome: Progressing     Problem: DISCHARGE PLANNING  Goal: Discharge to home or other facility with appropriate resources  Description: INTERVENTIONS:  - Identify barriers to discharge w/patient and caregiver  - Arrange for needed discharge resources and transportation as appropriate  - Identify discharge learning needs (meds, wound care, etc )  - Arrange for interpretive services to assist at discharge as needed  - Refer to Case Management Department for coordinating discharge planning if the patient needs post-hospital services based on physician/advanced practitioner order or complex needs related to functional status, cognitive ability, or social support system  Outcome: Progressing     Problem: Knowledge Deficit  Goal: Patient/family/caregiver demonstrates understanding of disease process, treatment plan, medications, and discharge instructions  Description: Complete learning assessment and assess knowledge base    Interventions:  - Provide teaching at level of understanding  - Provide teaching via preferred learning methods  Outcome: Progressing

## 2021-08-16 NOTE — ANESTHESIA PREPROCEDURE EVALUATION
Procedure:  EGD    Relevant Problems   ANESTHESIA (within normal limits)      CARDIO   (+) Essential hypertension   (+) Hyperlipidemia      GI/HEPATIC  heavy alcohol use  no nausea/vomiting   (+) Acute GI bleeding      HEMATOLOGY   (+) Acute blood loss anemia      NEURO/PSYCH   (+) CVA (cerebral vascular accident) ([address] ) (left PCA stroke 5/2021  residual right visual field deficit)      PULMONARY   (-) Shortness of breath   (-) Sleep apnea   (-) Smoking   (-) URI (upper respiratory infection)      Other   (+) Prediabetes      Physical Exam    Airway    Mallampati score: II  TM Distance: >3 FB  Neck ROM: full     Dental   Comment: Partial removed; denies loose,     Cardiovascular      Pulmonary      Other Findings       Lab Results   Component Value Date    WBC 8 16 08/16/2021    HGB 8 9 (L) 08/16/2021     08/16/2021   s/p 2 units RBC transfusion    Lab Results   Component Value Date    SODIUM 138 08/16/2021    K 3 7 08/16/2021    BUN 16 08/16/2021    CREATININE 0 94 08/16/2021    EGFR 87 08/16/2021     Lab Results   Component Value Date    HGBA1C 5 9 (H) 06/01/2021     TTE 6/1/21 SUMMARY     LEFT VENTRICLE:  Size was normal   Systolic function was by EF (biplane method of disks)  Ejection fraction was estimated to be 60 %  There were no regional wall motion abnormalities  Wall thickness was mildly to moderately increased  Doppler parameters were consistent with abnormal left ventricular relaxation (grade 1 diastolic dysfunction)      MITRAL VALVE:  There was trace regurgitation      TRICUSPID VALVE:  There was mild regurgitation  Pulmonary artery systolic pressure was within the normal range      PULMONIC VALVE:  There was trace regurgitation      PERICARDIUM:  The pericardium was normal in appearance      Anesthesia Plan  ASA Score- 3     Anesthesia Type- IV sedation with anesthesia with ASA Monitors           Additional Monitors:   Airway Plan:           Plan Factors-Exercise tolerance (METS): >4 METS     Chart reviewed  Existing labs reviewed  Patient summary reviewed  Patient is not a current smoker  Induction- intravenous  Postoperative Plan-     Informed Consent- Anesthetic plan and risks discussed with patient  I personally reviewed this patient with the CRNA  Discussed and agreed on the Anesthesia Plan with the CRNA  Bonnie Mendez

## 2021-08-16 NOTE — H&P (VIEW-ONLY)
Consultation - [address] [place] [address] [facility]  Victoriano Yener 56 y o  male MRN: [id number]  Unit/Bed#: -Deneen Encounter: [id number]        Consults    Reason for Consult / Principal Problem:     Acute anemia  Melena  Epigastric pain      ASSESSMENT AND PLAN:      Acute anemia  Melena  Epigastric pain  -suspect PUD especially in the setting of NSAID use half while on Plavix  -continue with Protonix infusion  -plan on EGD for further evaluation of suspected upper GI bleed after cardiac evaluation given increase in troponin(likely demand ischemia given significant drop in hemoglobin)  -avoid NSAIDs  -keep NPO for now  -transfuse for hemoglobin <8    Elevated liver enzymes  -probably a component of ischemic hepatitis  -his heavy alcohol use history is certainly contributing to this however would usually see AST:ALT ratio in a 2:1 fashion and this is not the case  -given the acute nature of this would not do extensive workup for now  -follow-up hepatitis panel  -check right upper quadrant ultrasound    ______________________________________________________________________    HPI:  67-year-old male seen in consultation for acute anemia, epigastric pain and melena  Patient has significant past medical history for hypertension, hyperlipidemia, CVA on Plavix  Patient presents with worsening epigastric abdominal pain over the past 1 week associated with increased weakness, shortness of breath, lightheadedness and melanic stool  He endorses nausea but no vomiting, no diarrhea, no constipation, no intentional weight loss  Over the past 2 weeks he has been dealing with tooth pain/infection and had been taking NSAIDs on a regular basis as well as antibiotics and he is on Plavix for a stroke from earlier this year  He reports having a normal colonoscopy earlier this year and repeat was recommended in 10 years  He has never undergone EGD    He denies current tobacco use however he endorses 5 beers per day about 5 times per week for many years  He denies known history of liver disease or family history of liver disease  He denies excessive Tylenol use or herbal supplements  REVIEW OF SYSTEMS:    CONSTITUTIONAL: Denies any fever, chills, rigors, and weight loss  HEENT: No earache or tinnitus  Denies hearing loss or visual disturbances  CARDIOVASCULAR: No chest pain or palpitations  RESPIRATORY: Denies any cough, hemoptysis,+dyspnea on exertion  GASTROINTESTINAL: As noted in the History of Present Illness  GENITOURINARY: No problems with urination  Denies any hematuria or dysuria  NEUROLOGIC: +dizziness  MUSCULOSKELETAL: Denies any muscle or joint pain  SKIN: Denies skin rashes or itching  ENDOCRINE: Denies excessive thirst  Denies intolerance to heat or cold  PSYCHOSOCIAL: Denies depression or anxiety  Denies any recent memory loss         Historical Information   Past Medical History:   Diagnosis Date    History of high cholesterol     Hypertension     Stroke [facility])      Past Surgical History:   Procedure Laterality Date    HERNIA REPAIR       Social History   Social History     Substance and Sexual Activity   Alcohol Use Yes    Alcohol/week: 3 0 standard drinks    Types: 3 Cans of beer per week    Comment: 2-3 beers a day     Social History     Substance and Sexual Activity   Drug Use Never     Social History     Tobacco Use   Smoking Status Never Smoker   Smokeless Tobacco Never Used     Family History   Problem Relation Age of Onset    Stroke Mother     Heart disease Father        Meds/Allergies     Medications Prior to Admission   Medication    amLODIPine (NORVASC) 5 mg tablet    [] amoxicillin-clavulanate (AUGMENTIN) 875-125 mg per tablet    atorvastatin (LIPITOR) 40 mg tablet    clopidogrel (PLAVIX) 75 mg tablet    hydrochlorothiazide (HYDRODIURIL) 12 5 mg tablet    ketorolac (TORADOL) 10 mg tablet    lisinopril (ZESTRIL) 10 mg tablet     Current Facility-Administered Medications Medication Dose Route Frequency    acetaminophen (TYLENOL) tablet 650 mg  650 mg Oral Q6H PRN    atorvastatin (LIPITOR) tablet 40 mg  40 mg Oral Daily With Dinner    clindamycin (CLEOCIN) IVPB (premix in dextrose) 600 mg 50 mL  600 mg Intravenous Q8H    multi-electrolyte (PLASMALYTE-A/ISOLYTE-S PH 7 4) IV solution  125 mL/hr Intravenous Continuous    ondansetron (ZOFRAN) injection 4 mg  4 mg Intravenous Q6H PRN    pantoprazole (PROTONIX) 80 mg in sodium chloride 0 9 % 100 mL infusion  8 mg/hr Intravenous Continuous    pneumococcal 13-valent conjugate vaccine (PREVNAR-13) IM injection 0 5 mL  0 5 mL Intramuscular Prior to discharge       No Known Allergies        Objective     Blood pressure 114/78, pulse 74, temperature 98 6 °F (37 °C), resp  rate 17, weight 89 2 kg (196 lb 10 4 oz), SpO2 98 %  Body mass index is 29 04 kg/m²        Intake/Output Summary (Last 24 hours) at 8/16/2021 0824  Last data filed at 8/16/2021 0530  Gross per 24 hour   Intake 2074 66 ml   Output 200 ml   Net 1874 66 ml         PHYSICAL EXAM:      General Appearance:   Alert, cooperative, no distress   HEENT:   Normocephalic, atraumatic, anicteric      Neck:  Supple, symmetrical, trachea midline   Lungs:   respirations unlabored    Heart[de-identified]   Regular rate   Abdomen:   Soft, mild epigastric tenderness; no masses, no organomegaly    Genitalia:   Deferred    Rectal:   Deferred; visualized melena in toilet bowl    Extremities:  No cyanosis, clubbing or edema        Skin:  No jaundice, rashes, or lesions    Lymph nodes:  No palpable cervical lymphadenopathy        Lab Results:   Admission on 08/15/2021   Component Date Value    WBC 08/15/2021 10 39*    RBC 08/15/2021 2 49*    Hemoglobin 08/15/2021 7 6*    Hematocrit 08/15/2021 22 7*    MCV 08/15/2021 91     MCH 08/15/2021 30 5     MCHC 08/15/2021 33 5     RDW 08/15/2021 13 3     MPV 08/15/2021 9 9     Platelets 32/73/1192 286     nRBC 08/15/2021 0     Neutrophils Relative 08/15/2021 72     Immat GRANS % 08/15/2021 1     Lymphocytes Relative 08/15/2021 17     Monocytes Relative 08/15/2021 7     Eosinophils Relative 08/15/2021 2     Basophils Relative 08/15/2021 1     Neutrophils Absolute 08/15/2021 7 53     Immature Grans Absolute 08/15/2021 0 14     Lymphocytes Absolute 08/15/2021 1 71     Monocytes Absolute 08/15/2021 0 77     Eosinophils Absolute 08/15/2021 0 18     Basophils Absolute 08/15/2021 0 06     Protime 08/15/2021 12 9     INR 08/15/2021 0 99     PTT 08/15/2021 30     Sodium 08/15/2021 139     Potassium 08/15/2021 3 4*    Chloride 08/15/2021 104     CO2 08/15/2021 26     ANION GAP 08/15/2021 9     BUN 08/15/2021 23     Creatinine 08/15/2021 1 09     Glucose 08/15/2021 146*    Calcium 08/15/2021 8 3     Corrected Calcium 08/15/2021 9 1     AST 08/15/2021 52*    ALT 08/15/2021 120*    Alkaline Phosphatase 08/15/2021 82     Total Protein 08/15/2021 6 5     Albumin 08/15/2021 3 0*    Total Bilirubin 08/15/2021 0 22     eGFR 08/15/2021 72     Troponin I 08/15/2021 0 06*    Troponin I 08/15/2021 <0 02     NT-proBNP 08/15/2021 98     Lipase 08/15/2021 178     EXT Fecal Occult Blood (* 08/15/2021 positive     Control 08/15/2021 valid     ABO Grouping 08/15/2021 O     Rh Factor 08/15/2021 Positive     Antibody Screen 08/15/2021 Negative     Specimen Expiration Date 08/15/2021 [id number]     ABO Grouping 08/15/2021 O     Rh Factor 08/15/2021 Positive     Unit Product Code 08/16/2021 F3052V55     Unit Number 08/16/2021 [id number]     Unit ABO 08/16/2021 O     Unit RH 08/16/2021 POS     Crossmatch 08/16/2021 Compatible     Unit Dispense Status 08/16/2021 Presumed Trans     Unit Product Volume 08/16/2021 350     Unit Product Code 08/16/2021 H9100R33     Unit Number 08/16/2021 [id number]     Unit ABO 08/16/2021 O     Unit RH 08/16/2021 POS     Crossmatch 08/16/2021 Compatible     Unit Dispense Status 08/16/2021 Presumed Trans     Unit Product Volume 08/16/2021 350     Troponin I 08/16/2021 0 18*    Hemoglobin 08/16/2021 8 5*    Sodium 08/16/2021 138     Potassium 08/16/2021 3 7     Chloride 08/16/2021 105     CO2 08/16/2021 24     ANION GAP 08/16/2021 9     BUN 08/16/2021 16     Creatinine 08/16/2021 0 94     Glucose 08/16/2021 112     Calcium 08/16/2021 8 0*    Corrected Calcium 08/16/2021 8 9     AST 08/16/2021 39     ALT 08/16/2021 102*    Alkaline Phosphatase 08/16/2021 65     Total Protein 08/16/2021 6 1*    Albumin 08/16/2021 2 9*    Total Bilirubin 08/16/2021 0 41     eGFR 08/16/2021 87     WBC 08/16/2021 8 16     RBC 08/16/2021 2 95*    Hemoglobin 08/16/2021 8 9*    Hematocrit 08/16/2021 25 9*    MCV 08/16/2021 88     MCH 08/16/2021 30 2     MCHC 08/16/2021 34 4     RDW 08/16/2021 14 1     MPV 08/16/2021 9 8     Platelets 73/20/8367 217     nRBC 08/16/2021 0     Neutrophils Relative 08/16/2021 70     Immat GRANS % 08/16/2021 1     Lymphocytes Relative 08/16/2021 17     Monocytes Relative 08/16/2021 9     Eosinophils Relative 08/16/2021 3     Basophils Relative 08/16/2021 0     Neutrophils Absolute 08/16/2021 5 73     Immature Grans Absolute 08/16/2021 0 08     Lymphocytes Absolute 08/16/2021 1 41     Monocytes Absolute 08/16/2021 0 71     Eosinophils Absolute 08/16/2021 0 20     Basophils Absolute 08/16/2021 0 03        Imaging Studies: I have personally reviewed pertinent imaging studies

## 2021-08-16 NOTE — ASSESSMENT & PLAN NOTE
Blood pressure has been controlled during admission  Amlodipine 5 mg daily, HCTZ 12 5 mg daily, and Lisinopril 10 mg daily at home, however these are on hold secondary to symptoms upon hospital presentation  May resume medications as needed for blood pressure control during hospital stay  Blood pressure today remains well controlled

## 2021-08-16 NOTE — ASSESSMENT & PLAN NOTE
· With no current chest pain and no EKG changes  Finally down trended to 0 12 suspect secondary to demand ischemia secondary to acute blood loss anemia  He had an echo that was normal back in June 2021 awaiting Cardiology but preliminary discussed with them suspect secondary to demand ischemia of acute blood loss anemia

## 2021-08-16 NOTE — UTILIZATION REVIEW
Inpatient Admission Authorization Request   NOTIFICATION OF INPATIENT ADMISSION/INPATIENT AUTHORIZATION REQUEST   SERVICING FACILITY:   [address] [place] [address]  Alexi Martinez [address], [address]  Tax ID: [id number]  NPI: [id number]  Place of Service: Inpatient [facility]  [address]  Place of Service Code: 24     ATTENDING PROVIDER:  Attending Name and NPI#: Karina Lopez [[id number]]  Address: Alexi Martinez [address] [place] [address], [address]  Phone: [phone]     UTILIZATION REVIEW CONTACT:  Yoan Sanchez, Utilization   Network Utilization Review Department  Phone: [phone]  Fax [phone]  Email: [email or web address]     PHYSICIAN ADVISORY SERVICES:  FOR GEDX-VC-KBQU REVIEW - MEDICAL NECESSITY DENIAL  Phone: [phone]  Fax: [phone]  Email: [email or web address]     TYPE OF REQUEST:  Inpatient Status     ADMISSION INFORMATION:  ADMISSION DATE/TIME: 8/15/21  5:30 PM  PATIENT DIAGNOSIS CODE/DESCRIPTION:  Chest discomfort [R07 89]  Chest pain [R07 9]  Upper GI bleeding [K92 2]  Anemia [D64 9]  Abdominal discomfort, epigastric [R10 13]  DISCHARGE DATE/TIME: No discharge date for patient encounter  DISCHARGE DISPOSITION (IF DISCHARGED): Home/Self Care     IMPORTANT INFORMATION:  Please contact the Yoan Sanchez directly with any questions or concerns regarding this request  Department voicemails are confidential     Send requests for admission clinical reviews, concurrent reviews, approvals, and administrative denials due to lack of clinical to fax [phone]

## 2021-08-16 NOTE — PROGRESS NOTES
[address]  Progress Note - Eduard Shaw 1960, 56 y o  male MRN: [id number]  Unit/Bed#: -Deneen Encounter: [id number]  Primary Care Provider: Leodan Gaston DO   Date and time admitted to hospital: 8/15/2021  4:35 PM    * Acute GI bleeding  Assessment & Plan  · Gastric pain and melenic stools since yesterday  With acute anemia  ER discussed with the on-call GI NPO at midnight the EGD tomorrow  Patient is on Plavix with stroke in June will hold  · H&H Q 6  · 2 units of PRBC transfusion has been ordered  · Protonix drip has been ordered  · Place IV fluid but decreased  · Hemoglobin improved after 2 units discontinue H&H Q 6  Will plan for q 12  · Transfuse if less than 8  · As soon as cleared by cardiology will proceed with EGD as discussed with GI    Troponin level elevated  Assessment & Plan  · With no current chest pain and no EKG changes  Finally down trended to 0 12 suspect secondary to demand ischemia secondary to acute blood loss anemia  He had an echo that was normal back in June 2021 awaiting Cardiology but preliminary discussed with them suspect secondary to demand ischemia of acute blood loss anemia  Dental abscess  Assessment & Plan  · Was started on clindamycin back in August 7 for 10 days will need to complete to August 17th will place him on IV light of NPO GI bleed patient is to follow-up with dentist    Hypokalemia  Assessment & Plan  · Replace resolved    Transaminitis  Assessment & Plan  · In June he had negative LFTs he has been on Lipitor  Will hold Lipitor and see if it resolves  Alt > ast   - the they are not greater than 5 times normal okay to continue Lipitor recheck in the morning  Improved  GI already on consult suspect ischemic hepatitis from GI bleed hepatitis panel is pending  Acute blood loss anemia  Assessment & Plan  · Symptomatic with exertional shortness of breath for the past week now with epigastric pain and melena    Suspect secondary to upper GI bleed  See above NPO EGD  consultation to Gastroenterology Protonix drip has been started 2 units of PRBC transfusion has been done H&H q  Switched to q 12 it improved to 8 9  · Hold Plavix  · In  was 15 hemoglobin  · Should undergo EGD as soon as cleared  · Iron panel iron deficiency anemia ferritin of 28    Prediabetes  Assessment & Plan  · Diagnosis last admission diet control    Essential hypertension  Assessment & Plan  · Blood pressure is controlled in light of GI bleed with hemoglobin is 7  5  Will hold antihypertensive to avoid hypotension    CVA (cerebral vascular accident) [facility])  Assessment & Plan  · May resume statin in light of GI bleed hold Plavix  Patient have stroke back in  he still has residual right-sided blurry vision  VTE Pharmacologic Prophylaxis: VTE Score: 2 Low Risk (Score 0-2) - Encourage Ambulation  Patient Centered Rounds: I performed bedside rounds with nursing staff today  Discussions with Specialists or Other Care Team Provider:  Discussed with Gastroenterology and Cardiology    Education and Discussions with Family / Patient: Updated  (niece) via phone  Time Spent for Care: 30 minutes  More than 50% of total time spent on counseling and coordination of care as described above  Current Length of Stay: 1 day(s)  Current Patient Status: Inpatient   Certification Statement: The patient will continue to require additional inpatient hospital stay due to Secondary to GI bleed  Discharge Plan: Anticipate discharge in 24-48 hrs to home  Code Status: Level 1 - Full Code    Subjective:   Patient seen and examined denies any complaints    But he did have some melenic stools today    Objective:     Vitals:   Temp (24hrs), Av 7 °F (37 1 °C), Min:98 1 °F (36 7 °C), Max:99 2 °F (37 3 °C)    Temp:  [98 1 °F (36 7 °C)-99 2 °F (37 3 °C)] 98 6 °F (37 °C)  HR:  [] 74  Resp:  [17-24] 17  BP: (113-128)/(67-84) 114/78  SpO2:  [94 %-99 %] 98 %  Body mass index is 29 04 kg/m²  Input and Output Summary (last 24 hours): Intake/Output Summary (Last 24 hours) at 8/16/2021 1126  Last data filed at 8/16/2021 0530  Gross per 24 hour   Intake 2074 66 ml   Output 200 ml   Net 1874 66 ml       Physical Exam:   Physical Exam  Vitals and nursing note reviewed  Constitutional:       Appearance: He is well-developed  HENT:      Head: Normocephalic and atraumatic  Eyes:      Conjunctiva/sclera: Conjunctivae normal    Cardiovascular:      Rate and Rhythm: Normal rate and regular rhythm  Heart sounds: No murmur heard  Pulmonary:      Effort: Pulmonary effort is normal  No respiratory distress  Breath sounds: Normal breath sounds  Abdominal:      Palpations: Abdomen is soft  Tenderness: There is no abdominal tenderness  Musculoskeletal:      Cervical back: Neck supple  Skin:     General: Skin is warm and dry  Neurological:      General: No focal deficit present  Mental Status: He is alert and oriented to person, place, and time  Mental status is at baseline  Additional Data:     Labs:  Results from last 7 days   Lab Units 08/16/21  0522   WBC Thousand/uL 8 16   HEMOGLOBIN g/dL 8 9*   HEMATOCRIT % 25 9*   PLATELETS Thousands/uL 217   NEUTROS PCT % 70   LYMPHS PCT % 17   MONOS PCT % 9   EOS PCT % 3     Results from last 7 days   Lab Units 08/16/21  0522   SODIUM mmol/L 138   POTASSIUM mmol/L 3 7   CHLORIDE mmol/L 105   CO2 mmol/L 24   BUN mg/dL 16   CREATININE mg/dL 0 94   ANION GAP mmol/L 9   CALCIUM mg/dL 8 0*   ALBUMIN g/dL 2 9*   TOTAL BILIRUBIN mg/dL 0 41   ALK PHOS U/L 65   ALT U/L 102*   AST U/L 39   GLUCOSE RANDOM mg/dL 112     Results from last 7 days   Lab Units 08/15/21  1652   INR  0 99                   Lines/Drains:  Invasive Devices     Peripheral Intravenous Line            Peripheral IV 08/15/21 Distal;Dorsal (posterior); Left Forearm <1 day    Peripheral IV 08/15/21 Left Antecubital <1 day Imaging: Reviewed radiology reports from this admission including: chest xray    Recent Cultures (last 7 days):         Last 24 Hours Medication List:   Current Facility-Administered Medications   Medication Dose Route Frequency Provider Last Rate    acetaminophen  650 mg Oral Q6H PRN Nury Wynn MD      atorvastatin  40 mg Oral Daily With Bree Bartholomew MD      clindamycin  600 mg Intravenous Q8H Nury Wynn  mg (08/16/21 0452)    multi-electrolyte  100 mL/hr Intravenous Continuous Nury Wynn  mL/hr (08/16/21 0904)    ondansetron  4 mg Intravenous Q6H PRN Nury Wynn MD      pantoprozole (PROTONIX) infusion (Continuous)  8 mg/hr Intravenous Continuous Ewa Ballesteros DO 8 mg/hr (08/16/21 0448)    pneumococcal 13-valent conjugate vaccine  0 5 mL Intramuscular Prior to discharge Nury Wynn MD          Today, Patient Was Seen By: Nury Wynn MD    **Please Note: This note may have been constructed using a voice recognition system  **

## 2021-08-16 NOTE — ASSESSMENT & PLAN NOTE
· In June he had negative LFTs he has been on Lipitor  Will hold Lipitor and see if it resolves  Alt > ast   - the they are not greater than 5 times normal okay to continue Lipitor recheck in the morning  Improved  GI already on consult suspect ischemic hepatitis from GI bleed hepatitis panel is pending

## 2021-08-16 NOTE — CONSULTS
Consult Cardiology    David Dumont 1960, 56 y o  male MRN: [id number]    Unit/Bed#: -01 Encounter: [id number]    Attending Provider: Verne Heimlich, MD   Primary Care Provider: Selina Jean DO   Date admitted to hospital: 8/15/2021       Consults    Pre-op exam  Assessment & Plan  Pt with RCRI of 1 with 6% risk of death, MI, or cardiac arrest   Able to perform > 4 METs of activity  Troponin elevation appears to be in the setting of demand given symptomatic anemia  No symptoms consistent with angina  Recommend telemetry monitoring   OK to proceed with GI evaluation     Troponin level elevated  Assessment & Plan  Troponin levels 0 02 ->0 06->0 18->0 12  Likely in the setting of demand given acute GI bleed with hemoglobin of 7 6 on admission  Pt reports no chest pain  He was experiencing shortness of breath and lightheadedness this weekend which has resolved following 1 unit of PRBC  Current troponin trend pending  Add telemetry - will continue to monitor  OK to proceed with GI evaluation       Hyperlipidemia  Assessment & Plan  Continue Atorvastatin 40mg daily  Plan for updated lipid panel in the outpatient setting  Acute blood loss anemia  Assessment & Plan  Pt reports onset of tarry stools last week, then development of shortness of breath on exertion with lightheadedness over the weekend  Upon admission found to have H/H: 7 6/22 7  He reports complete resolution of symptoms following administration of 1 unit PRBC  Awaiting GI evaluation for bleed  Prediabetes  Assessment & Plan  HA1c 5 9 in June 2021      Essential hypertension  Assessment & Plan  Blood pressure has been controlled during admission  On Amlodipine 5mg daily, HCTZ 12 5mg daily, and Lisinopril 10mg daily at home, however these are on hold secondary to symptomatic upon hospital presentation  May resume medications as needed for blood pressure control during hospital stay      CVA (cerebral vascular accident) [facility])  Assessment & Plan  Pt with history of cryogenic stroke in 5/2021  He is scheduled for outpatient consult with cardiology on 8/25/21 to discuss placement of loop recorder to evaluate for possible presence of atrial fibrillation  On Plavix 75mg daily at home, currently on hold secondary to questionable GI bleed  Pt reports tarry stool and has symptomatic anemia on presentation, awaiting GI evaluation  Continue Atorvastatin 40mg daily  BP controlled during hospital stay  Further instructions on Plavix based on GI work up  Physician Requesting Consult: Akin Mart MD    Reason for Consult / Principal Problem: Pre-operative evaluation  Elevated troponin level  HPI: Alma Neville is a 56y o  year old male who has a history of impaired glucose, HTN, HLD, s/p 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 2021  Patient presents with onset of tarry stools, epigastric pain, shortness of breath and lightheadedness that started Saturday  He was admitted for vision loss and diagnosed with cryptogenic stroke in 5/2021 and started on Plavix  He is scheduled for outpatient consult with cardiology on 8/25/2021  He denies any history of cardiac diagnosis and denies ever seeing a cardiologist in the past  He denies history of tobacco use  He reports drinking 2-3 beers daily  He states he is very active and tolerates exercise without issue  He was started on Augmentin for a dental abscess last week  He then developed tarry bowel movements  On Saturday he states he developed epigastric pain  He started feeling short of breath and lightheaded yesterday (8/15/2021) and he proceeded to [facility] ER for evaluation  Upon admission his hemoglobin was 7 6  He has received 2 units of PRBC and he reports complete resolution of his symptoms  At the time of my examination he denies any lightheadedness/dizziness, chest pain, shortness of breath  He denies any history of palpitations   He states he did feel his heart beating fast on Sunday but that has resolved as well  He denies leg swelling  Review of Systems   Constitutional: Negative for chills and fever  HENT: Negative for ear pain and sore throat  Eyes: Negative for pain and visual disturbance  Respiratory: Positive for shortness of breath (resolved)  Negative for cough  Cardiovascular: Negative for chest pain, palpitations and leg swelling  Gastrointestinal: Positive for abdominal pain and blood in stool (tarry stool)  Negative for vomiting  Genitourinary: Negative for dysuria and hematuria  Musculoskeletal: Negative for arthralgias and back pain  Skin: Negative for color change and rash  Neurological: Positive for light-headedness (resolved)  Negative for seizures and syncope  All other systems reviewed and are negative         Historical Information     Past Medical History:   Diagnosis Date    History of high cholesterol     Hypertension     Impaired glucose tolerance     Stroke [facility])      Past Surgical History:   Procedure Laterality Date    HERNIA REPAIR       Social History     Substance and Sexual Activity   Alcohol Use Yes    Alcohol/week: 21 0 standard drinks    Types: 21 Cans of beer per week    Comment: 2-3 beers a day     Social History     Substance and Sexual Activity   Drug Use Never     Social History     Tobacco Use   Smoking Status Never Smoker   Smokeless Tobacco Never Used       Family History:   Family History   Problem Relation Age of Onset    Stroke Mother     Heart disease Father     COPD Father     Cancer Brother     Hypertension Brother     No Known Problems Brother     No Known Problems Brother     No Known Problems Brother        Meds/Allergies     current meds:   Current Facility-Administered Medications   Medication Dose Route Frequency    acetaminophen (TYLENOL) tablet 650 mg  650 mg Oral Q6H PRN    atorvastatin (LIPITOR) tablet 40 mg  40 mg Oral Daily With Dinner    clindamycin (CLEOCIN) IVPB (premix in dextrose) 600 mg 50 mL  600 mg Intravenous Q8H    multi-electrolyte (PLASMALYTE-A/ISOLYTE-S PH 7 4) IV solution  100 mL/hr Intravenous Continuous    ondansetron (ZOFRAN) injection 4 mg  4 mg Intravenous Q6H PRN    pantoprazole (PROTONIX) 80 mg in sodium chloride 0 9 % 100 mL infusion  8 mg/hr Intravenous Continuous    pneumococcal 13-valent conjugate vaccine (PREVNAR-13) IM injection 0 5 mL  0 5 mL Intramuscular Prior to discharge     multi-electrolyte, 100 mL/hr, Last Rate: 100 mL/hr (08/16/21 1140)  pantoprozole (PROTONIX) infusion (Continuous), 8 mg/hr, Last Rate: 8 mg/hr (08/16/21 0448)        No Known Allergies    Objective     Vitals: Blood pressure 114/78, pulse 74, temperature 98 6 °F (37 °C), resp  rate 17, weight 89 2 kg (196 lb 10 4 oz), SpO2 98 %  , Body mass index is 29 04 kg/m²  Orthostatic Blood Pressures      Most Recent Value   Blood Pressure  114/78 filed at 08/16/2021 0656   Patient Position - Orthostatic VS  Lying filed at 08/16/2021 1168          Systolic (10IUZ), LRY:068 , Min:113 , LAB:471     Diastolic (98PSH), COL:95, Min:67, Max:84        Intake/Output Summary (Last 24 hours) at 8/16/2021 1202  Last data filed at 8/16/2021 0530  Gross per 24 hour   Intake 2074 66 ml   Output 200 ml   Net 1874 66 ml       Weight (last 2 days)     Date/Time   Weight    08/15/21 1641   89 2 (196 65)              Invasive Devices     Peripheral Intravenous Line            Peripheral IV 08/15/21 Distal;Dorsal (posterior); Left Forearm <1 day    Peripheral IV 08/15/21 Left Antecubital <1 day                Physical Exam  Vitals and nursing note reviewed  Constitutional:       Appearance: He is well-developed  HENT:      Head: Normocephalic and atraumatic  Eyes:      Conjunctiva/sclera: Conjunctivae normal    Neck:      Vascular: No carotid bruit or JVD  Cardiovascular:      Rate and Rhythm: Normal rate and regular rhythm  No extrasystoles are present       Heart sounds: S1 normal and S2 normal  No murmur heard  Comments: No lower leg edema on exam  Pulmonary:      Effort: Pulmonary effort is normal  No respiratory distress  Breath sounds: Normal breath sounds  Abdominal:      Palpations: Abdomen is soft  Tenderness: There is no abdominal tenderness  Musculoskeletal:      Cervical back: Neck supple  Skin:     General: Skin is warm and dry  Neurological:      Mental Status: He is alert  Psychiatric:         Mood and Affect: Mood normal          Speech: Speech normal          Behavior: Behavior normal  Behavior is cooperative           Cognition and Memory: Cognition normal               Laboratory Results:    Results from last 7 days   Lab Units 08/16/21  1131 08/16/21  0835 08/16/21  0000   TROPONIN I ng/mL 0 10* 0 12* 0 18*       CBC with diff:   Results from last 7 days   Lab Units 08/16/21  0522 08/16/21  0000 08/15/21  1652   WBC Thousand/uL 8 16  --  10 39*   HEMOGLOBIN g/dL 8 9* 8 5* 7 6*   HEMATOCRIT % 25 9*  --  22 7*   MCV fL 88  --  91   PLATELETS Thousands/uL 217  --  286   MCH pg 30 2  --  30 5   MCHC g/dL 34 4  --  33 5   RDW % 14 1  --  13 3   MPV fL 9 8  --  9 9   NRBC AUTO /100 WBCs 0  --  0         CMP:  Results from last 7 days   Lab Units 08/16/21  0522 08/15/21  1652   POTASSIUM mmol/L 3 7 3 4*   CHLORIDE mmol/L 105 104   CO2 mmol/L 24 26   BUN mg/dL 16 23   CREATININE mg/dL 0 94 1 09   CALCIUM mg/dL 8 0* 8 3   AST U/L 39 52*   ALT U/L 102* 120*   ALK PHOS U/L 65 82   EGFR ml/min/1 73sq m 87 72       BMP:  Results from last 7 days   Lab Units 08/16/21  0522 08/15/21  1652   POTASSIUM mmol/L 3 7 3 4*   CHLORIDE mmol/L 105 104   CO2 mmol/L 24 26   BUN mg/dL 16 23   CREATININE mg/dL 0 94 1 09   CALCIUM mg/dL 8 0* 8 3       Coags:   Results from last 7 days   Lab Units 08/15/21  1652   PTT seconds 30   INR  0 99       Hemoglobin A1C:   5 9 (6/2021)    Lipid Profile:   Lab Results   Component Value Date    CHOLESTEROL 192 06/01/2021     Lab Results Component Value Date    HDL 56 2021     Lab Results   Component Value Date    TRIG 160 (H) 2021     No results found for: Crescencio     Cardiac testing:     Results for orders placed during the hospital encounter of 21    Echo complete with contrast if indicated    Narrative  [address]  [address]    Transthoracic Echocardiogram  2D, Doppler, and Color Doppler    Study date:  2021    Patient: Amanda Moses  MR number: [id number]  Account number: [de-identified]  : 1960  Age: 56 years  Gender: Male  Status: Inpatient  Location: [facility]  Height: 69 in  Weight: 198 7 lb  BP: 144/ 104 mmHg    Indications: cerebrovascular accident  Diagnoses: I67 9 - Cerebrovascular disease, unspecified    Sonographer:  Kae Archuleta RDCS  Referring Physician:  Merritt Castanon MD  Group:  [facility]  Interpreting Physician:  Cassy Escobedo MD    SUMMARY    LEFT VENTRICLE:  Size was normal   Systolic function was by EF (biplane method of disks)  Ejection fraction was estimated to be 60 %  There were no regional wall motion abnormalities  Wall thickness was mildly to moderately increased  Doppler parameters were consistent with abnormal left ventricular relaxation (grade 1 diastolic dysfunction)  MITRAL VALVE:  There was trace regurgitation  TRICUSPID VALVE:  There was mild regurgitation  Pulmonary artery systolic pressure was within the normal range  PULMONIC VALVE:  There was trace regurgitation  PERICARDIUM:  The pericardium was normal in appearance  HISTORY: PRIOR HISTORY: cerebrovascular accident, hypertension, hyperlipidemia, decreased visual acuity  PROCEDURE: The study was performed in the [facility]  This was a routine study  The transthoracic approach was used   The study included complete 2D imaging, complete spectral Doppler, and color Doppler  The heart rate  was 66 bpm, at the start of the study  Echocardiographic views were limited due to decreased penetration  Image quality was adequate  LEFT VENTRICLE: Size was normal  Systolic function was by EF (biplane method of disks)  Ejection fraction was estimated to be 60 %  There were no regional wall motion abnormalities  Wall thickness was mildly to moderately increased  DOPPLER: Doppler parameters were consistent with abnormal left ventricular relaxation (grade 1 diastolic dysfunction)  RIGHT VENTRICLE: The size was normal  Systolic function was normal  Wall thickness was normal     LEFT ATRIUM: Size was normal     RIGHT ATRIUM: Size was normal     MITRAL VALVE: Valve structure was normal  There was normal leaflet separation  DOPPLER: The transmitral velocity was within the normal range  There was no evidence for stenosis  There was trace regurgitation  AORTIC VALVE: The valve was trileaflet  Leaflets exhibited normal thickness and normal cuspal separation  DOPPLER: Transaortic velocity was within the normal range  There was no evidence for stenosis  There was no regurgitation  TRICUSPID VALVE: The valve structure was normal  There was normal leaflet separation  DOPPLER: The transtricuspid velocity was within the normal range  There was no evidence for stenosis  There was mild regurgitation  Pulmonary artery  systolic pressure was within the normal range  PULMONIC VALVE: Leaflets exhibited normal thickness, no calcification, and normal cuspal separation  DOPPLER: The transpulmonic velocity was within the normal range  There was trace regurgitation  PERICARDIUM: There was no pericardial effusion  The pericardium was normal in appearance  AORTA: The root exhibited normal size      SYSTEM MEASUREMENT TABLES    2D  %FS: 31 73 %  AV Diam: 3 58 cm  EDV(Teich): 80 21 ml  EF(Teich): 60 11 %  ESV(Teich): 32 ml  HR_4Ch_Q: 68 57 BPM  IVSd: 1 76 cm  LA Diam: 3 46 cm  LAAs A2C: 15 39 cm2  LAAs A4C: 16 61 cm2  LAESV A-L A2C: 34 14 ml  LAESV A-L A4C: 46 54 ml  LAESV Index (A-L): 20 93 ml/m2  LAESV MOD A2C: 32 83 ml  LAESV MOD A4C: 44 15 ml  LAESV(A-L): 43 12 ml  LALs A2C: 5 89 cm  LALs A4C: 5 03 cm  LVCO_4Ch_Q: 3 25 L/min  LVEF_4Ch_Q: 59 76 %  LVIDd: 4 24 cm  LVIDs: 2 89 cm  LVLd_4Ch_Q: 8 67 cm  LVLs_4Ch_Q: 7 3 cm  LVOT Diam: 2 1 cm  LVPWd: 1 46 cm  LVSV_4Ch_Q: 47 45 ml  LVVED_4Ch_Q: 79 4 ml  LVVES_4Ch_Q: 31 95 ml  RAAs A4C: 13 51 cm2  RAESV A-L: 25 69 ml  RAESV MOD: 27 24 ml  RALs: 6 04 cm  RVIDd: 2 42 cm  RWT: 0 69  SV(Teich): 48 21 ml    CW  HR: 62 99 BPM  MV VTI: 30 81 cm  MV Vmax: 1 02 m/s  MV Vmean: 0 46 m/s  MV maxP 14 mmHg  MV meanP 06 mmHg  TR Vmax: 1 51 m/s  TR maxP 12 mmHg    MM  TAPSE: 2 17 cm    PW  E' Av 06 m/s  E' Lat: 0 06 m/s  E' Sept: 0 06 m/s  E/E' Av 67  E/E' Lat: 10 28  E/E' Sept: 9 13  MV A Jase: 0 97 m/s  MV Dec Hyde: 1 88 m/s2  MV DecT: 325 91 ms  MV E Jase: 0 58 m/s  MV E/A Ratio: 0 6    [employer] Accredited Echocardiography Laboratory    Prepared and electronically signed by    Nain Dupont MD  Signed 2021 12:32:12    No results found for this or any previous visit  No results found for this or any previous visit  No results found for this or any previous visit  Imaging: I have personally reviewed pertinent reports  XR chest 1 view portable    Result Date: 2021  Narrative: CHEST INDICATION:   cp  COMPARISON:  None EXAM PERFORMED/VIEWS:  XR CHEST PORTABLE FINDINGS: Cardiomediastinal silhouette appears unremarkable  The lungs are clear  No pneumothorax or pleural effusion  Osseous structures appear within normal limits for patient age  Impression: No acute cardiopulmonary disease  This report is in agreement with the preliminary interpretation   Workstation performed: [id number]     CT soft tissue neck with contrast    Result Date: 2021  Narrative: CT NECK WITH CONTRAST INDICATION:   left sided facial and neck swelling with mild trismus / include lower half of face  pt states left dental swelling started monday and saw pcp who put him on antibiotic there is the  states pain and swelling getting worse  denies difficulty  swallowing) COMPARISON:  5/31/2020 TECHNIQUE:  Axial, sagittal, and coronal 2D reformatted images were created from the axial source data and submitted for interpretation  Radiation dose length product (DLP) for this visit:  673 mGy-cm   This examination, like all CT scans performed in the [facility], was performed utilizing techniques to minimize radiation dose exposure, including the use of iterative reconstruction and automated exposure control  IV Contrast:  100 mL of iohexol (OMNIPAQUE) IMAGE QUALITY:  Diagnostic  FINDINGS: VISUALIZED BRAIN PARENCHYMA:  No acute intracranial pathology of the visualized brain parenchyma  VISUALIZED ORBITS AND PARANASAL SINUSES:  Mucous retention cyst/mucosal thickening in the right maxillary sinus noted  There is near complete opacification the right maxillary sinus  Mild left frontal and ethmoidal sinus disease  Mild ethmoidal sinusitis  NASAL CAVITY AND NASOPHARYNX:  Normal  SUPRAHYOID NECK:  Laterally in the left side of the oropharynx on image 47, series 4 there is a 1 cm peripherally ring-enhancing mucosal lesion which is indeterminate  In retrospect this was on the prior CTA and projected more exophytically into the lumen of the oropharynx  The mass is indeterminate  In the left  space adjacent to the left mandible there is soft tissue induration with slight subcutaneous edema compatible with phlegmonous changes and facial cellulitis  No discrete drainable abscess  Beam hardening artifact from dental fillings limits evaluation  INFRAHYOID NECK:  Aryepiglottic folds and piriform sinuses are normal   Normal glottis and subglottic airway  THYROID GLAND: 0 5 cm right-sided thyroid nodule    Incidental discovery of one or more thyroid nodule(s) measuring less than 1 5 cm and without suspicious features is noted in this patient who is above 28years old; according to guidelines published in the February 2015 white paper on incidental thyroid nodules in the Journal of the [employer] of Radiology [facility]), no further evaluation is recommended  PAROTID AND SUBMANDIBULAR GLANDS:  Normal  LYMPH NODES:  No pathologic or enlarged adenopathy  VASCULAR STRUCTURES:  Mild atherosclerotic vascular calcifications noted  THORACIC INLET:  2 mm right upper lobe pulmonary nodule image 91, series 4 noted, similar to the prior CTA where it was more calcified possibly a small calcified granuloma  BONY STRUCTURES: Mild spondylotic changes noted  Impression: 1   1 cm mucosal lesion with peripheral rim enhancement on left side of the oropharynx  Differential diagnosis includes squamous cell carcinoma  Other etiologies including a cyst could be considered  Nonemergent direct visualization/ENT assessment advised  2   Mild left  space cellulitis with slight induration/phlegmonous changes adjacent to the left mandible  No discrete  space abscess  I personally discussed this study with Ryan Ibrahim on 8/7/2021 at 2:16 PM  Workstation performed: [id number]       EKG reviewed personally: EKG: Sinus tachycardia, rate 103   Telemetry: ordered, pending       Counseling / Coordination of Care  Total floor / unit time spent today 45 minutes  Greater than 50% of total time was spent with the patient and / or family counseling and / or coordination of care  A description of the counseling / coordination of care: Spoke with Dr Angelica Gramajo          Code Status: Level 1 - Full Code

## 2021-08-16 NOTE — CONSULTS
Consultation - [address] [place] [address] [facility]  Keaton Young 56 y o  male MRN: [id number]  Unit/Bed#: -01 Encounter: [id number]        Consults    Reason for Consult / Principal Problem:     Acute anemia  Melena  Epigastric pain      ASSESSMENT AND PLAN:      Acute anemia  Melena  Epigastric pain  -suspect PUD especially in the setting of NSAID use half while on Plavix  -continue with Protonix infusion  -plan on EGD for further evaluation of suspected upper GI bleed after cardiac evaluation given increase in troponin(likely demand ischemia given significant drop in hemoglobin)  -avoid NSAIDs  -keep NPO for now  -transfuse for hemoglobin <8    Elevated liver enzymes  -probably a component of ischemic hepatitis  -his heavy alcohol use history is certainly contributing to this however would usually see AST:ALT ratio in a 2:1 fashion and this is not the case  -given the acute nature of this would not do extensive workup for now  -follow-up hepatitis panel  -check right upper quadrant ultrasound    ______________________________________________________________________    HPI:  68-year-old male seen in consultation for acute anemia, epigastric pain and melena  Patient has significant past medical history for hypertension, hyperlipidemia, CVA on Plavix  Patient presents with worsening epigastric abdominal pain over the past 1 week associated with increased weakness, shortness of breath, lightheadedness and melanic stool  He endorses nausea but no vomiting, no diarrhea, no constipation, no intentional weight loss  Over the past 2 weeks he has been dealing with tooth pain/infection and had been taking NSAIDs on a regular basis as well as antibiotics and he is on Plavix for a stroke from earlier this year  He reports having a normal colonoscopy earlier this year and repeat was recommended in 10 years  He has never undergone EGD    He denies current tobacco use however he endorses 5 beers per day about 5 times per week for many years  He denies known history of liver disease or family history of liver disease  He denies excessive Tylenol use or herbal supplements  REVIEW OF SYSTEMS:    CONSTITUTIONAL: Denies any fever, chills, rigors, and weight loss  HEENT: No earache or tinnitus  Denies hearing loss or visual disturbances  CARDIOVASCULAR: No chest pain or palpitations  RESPIRATORY: Denies any cough, hemoptysis,+dyspnea on exertion  GASTROINTESTINAL: As noted in the History of Present Illness  GENITOURINARY: No problems with urination  Denies any hematuria or dysuria  NEUROLOGIC: +dizziness  MUSCULOSKELETAL: Denies any muscle or joint pain  SKIN: Denies skin rashes or itching  ENDOCRINE: Denies excessive thirst  Denies intolerance to heat or cold  PSYCHOSOCIAL: Denies depression or anxiety  Denies any recent memory loss         Historical Information   Past Medical History:   Diagnosis Date    History of high cholesterol     Hypertension     Stroke [facility])      Past Surgical History:   Procedure Laterality Date    HERNIA REPAIR       Social History   Social History     Substance and Sexual Activity   Alcohol Use Yes    Alcohol/week: 3 0 standard drinks    Types: 3 Cans of beer per week    Comment: 2-3 beers a day     Social History     Substance and Sexual Activity   Drug Use Never     Social History     Tobacco Use   Smoking Status Never Smoker   Smokeless Tobacco Never Used     Family History   Problem Relation Age of Onset    Stroke Mother     Heart disease Father        Meds/Allergies     Medications Prior to Admission   Medication    amLODIPine (NORVASC) 5 mg tablet    [] amoxicillin-clavulanate (AUGMENTIN) 875-125 mg per tablet    atorvastatin (LIPITOR) 40 mg tablet    clopidogrel (PLAVIX) 75 mg tablet    hydrochlorothiazide (HYDRODIURIL) 12 5 mg tablet    ketorolac (TORADOL) 10 mg tablet    lisinopril (ZESTRIL) 10 mg tablet     Current Facility-Administered Medications Medication Dose Route Frequency    acetaminophen (TYLENOL) tablet 650 mg  650 mg Oral Q6H PRN    atorvastatin (LIPITOR) tablet 40 mg  40 mg Oral Daily With Dinner    clindamycin (CLEOCIN) IVPB (premix in dextrose) 600 mg 50 mL  600 mg Intravenous Q8H    multi-electrolyte (PLASMALYTE-A/ISOLYTE-S PH 7 4) IV solution  125 mL/hr Intravenous Continuous    ondansetron (ZOFRAN) injection 4 mg  4 mg Intravenous Q6H PRN    pantoprazole (PROTONIX) 80 mg in sodium chloride 0 9 % 100 mL infusion  8 mg/hr Intravenous Continuous    pneumococcal 13-valent conjugate vaccine (PREVNAR-13) IM injection 0 5 mL  0 5 mL Intramuscular Prior to discharge       No Known Allergies        Objective     Blood pressure 114/78, pulse 74, temperature 98 6 °F (37 °C), resp  rate 17, weight 89 2 kg (196 lb 10 4 oz), SpO2 98 %  Body mass index is 29 04 kg/m²        Intake/Output Summary (Last 24 hours) at 8/16/2021 0824  Last data filed at 8/16/2021 0530  Gross per 24 hour   Intake 2074 66 ml   Output 200 ml   Net 1874 66 ml         PHYSICAL EXAM:      General Appearance:   Alert, cooperative, no distress   HEENT:   Normocephalic, atraumatic, anicteric      Neck:  Supple, symmetrical, trachea midline   Lungs:   respirations unlabored    Heart[de-identified]   Regular rate   Abdomen:   Soft, mild epigastric tenderness; no masses, no organomegaly    Genitalia:   Deferred    Rectal:   Deferred; visualized melena in toilet bowl    Extremities:  No cyanosis, clubbing or edema        Skin:  No jaundice, rashes, or lesions    Lymph nodes:  No palpable cervical lymphadenopathy        Lab Results:   Admission on 08/15/2021   Component Date Value    WBC 08/15/2021 10 39*    RBC 08/15/2021 2 49*    Hemoglobin 08/15/2021 7 6*    Hematocrit 08/15/2021 22 7*    MCV 08/15/2021 91     MCH 08/15/2021 30 5     MCHC 08/15/2021 33 5     RDW 08/15/2021 13 3     MPV 08/15/2021 9 9     Platelets 09/54/6067 286     nRBC 08/15/2021 0     Neutrophils Relative 08/15/2021 72     Immat GRANS % 08/15/2021 1     Lymphocytes Relative 08/15/2021 17     Monocytes Relative 08/15/2021 7     Eosinophils Relative 08/15/2021 2     Basophils Relative 08/15/2021 1     Neutrophils Absolute 08/15/2021 7 53     Immature Grans Absolute 08/15/2021 0 14     Lymphocytes Absolute 08/15/2021 1 71     Monocytes Absolute 08/15/2021 0 77     Eosinophils Absolute 08/15/2021 0 18     Basophils Absolute 08/15/2021 0 06     Protime 08/15/2021 12 9     INR 08/15/2021 0 99     PTT 08/15/2021 30     Sodium 08/15/2021 139     Potassium 08/15/2021 3 4*    Chloride 08/15/2021 104     CO2 08/15/2021 26     ANION GAP 08/15/2021 9     BUN 08/15/2021 23     Creatinine 08/15/2021 1 09     Glucose 08/15/2021 146*    Calcium 08/15/2021 8 3     Corrected Calcium 08/15/2021 9 1     AST 08/15/2021 52*    ALT 08/15/2021 120*    Alkaline Phosphatase 08/15/2021 82     Total Protein 08/15/2021 6 5     Albumin 08/15/2021 3 0*    Total Bilirubin 08/15/2021 0 22     eGFR 08/15/2021 72     Troponin I 08/15/2021 0 06*    Troponin I 08/15/2021 <0 02     NT-proBNP 08/15/2021 98     Lipase 08/15/2021 178     EXT Fecal Occult Blood (* 08/15/2021 positive     Control 08/15/2021 valid     ABO Grouping 08/15/2021 O     Rh Factor 08/15/2021 Positive     Antibody Screen 08/15/2021 Negative     Specimen Expiration Date 08/15/2021 [id number]     ABO Grouping 08/15/2021 O     Rh Factor 08/15/2021 Positive     Unit Product Code 08/16/2021 M9876H75     Unit Number 08/16/2021 [id number]     Unit ABO 08/16/2021 O     Unit RH 08/16/2021 POS     Crossmatch 08/16/2021 Compatible     Unit Dispense Status 08/16/2021 Presumed Trans     Unit Product Volume 08/16/2021 350     Unit Product Code 08/16/2021 P0245D42     Unit Number 08/16/2021 [id number]     Unit ABO 08/16/2021 O     Unit RH 08/16/2021 POS     Crossmatch 08/16/2021 Compatible     Unit Dispense Status 08/16/2021 Presumed Trans     Unit Product Volume 08/16/2021 350     Troponin I 08/16/2021 0 18*    Hemoglobin 08/16/2021 8 5*    Sodium 08/16/2021 138     Potassium 08/16/2021 3 7     Chloride 08/16/2021 105     CO2 08/16/2021 24     ANION GAP 08/16/2021 9     BUN 08/16/2021 16     Creatinine 08/16/2021 0 94     Glucose 08/16/2021 112     Calcium 08/16/2021 8 0*    Corrected Calcium 08/16/2021 8 9     AST 08/16/2021 39     ALT 08/16/2021 102*    Alkaline Phosphatase 08/16/2021 65     Total Protein 08/16/2021 6 1*    Albumin 08/16/2021 2 9*    Total Bilirubin 08/16/2021 0 41     eGFR 08/16/2021 87     WBC 08/16/2021 8 16     RBC 08/16/2021 2 95*    Hemoglobin 08/16/2021 8 9*    Hematocrit 08/16/2021 25 9*    MCV 08/16/2021 88     MCH 08/16/2021 30 2     MCHC 08/16/2021 34 4     RDW 08/16/2021 14 1     MPV 08/16/2021 9 8     Platelets 80/74/2429 217     nRBC 08/16/2021 0     Neutrophils Relative 08/16/2021 70     Immat GRANS % 08/16/2021 1     Lymphocytes Relative 08/16/2021 17     Monocytes Relative 08/16/2021 9     Eosinophils Relative 08/16/2021 3     Basophils Relative 08/16/2021 0     Neutrophils Absolute 08/16/2021 5 73     Immature Grans Absolute 08/16/2021 0 08     Lymphocytes Absolute 08/16/2021 1 41     Monocytes Absolute 08/16/2021 0 71     Eosinophils Absolute 08/16/2021 0 20     Basophils Absolute 08/16/2021 0 03        Imaging Studies: I have personally reviewed pertinent imaging studies

## 2021-08-16 NOTE — PLAN OF CARE
Problem: Potential for Falls  Goal: Patient will remain free of falls  Description: INTERVENTIONS:  - Educate patient/family on patient safety including physical limitations  - Instruct patient to call for assistance with activity   - Consult OT/PT to assist with strengthening/mobility   - Keep Call bell within reach  - Keep bed low and locked with side rails adjusted as appropriate  - Keep care items and personal belongings within reach  - Initiate and maintain comfort rounds  - Make Fall Risk Sign visible to staff  - Apply yellow socks and bracelet for high fall risk patients  - Consider moving patient to room near nurses station  Outcome: Progressing     Problem: GASTROINTESTINAL - ADULT  Goal: Minimal or absence of nausea and/or vomiting  Description: INTERVENTIONS:  - Administer IV fluids if ordered to ensure adequate hydration  - Maintain NPO status until nausea and vomiting are resolved  - Nasogastric tube if ordered  - Administer ordered antiemetic medications as needed  - Provide nonpharmacologic comfort measures as appropriate  - Advance diet as tolerated, if ordered  - Consider nutrition services referral to assist patient with adequate nutrition and appropriate food choices  Outcome: Progressing  Goal: Maintains or returns to baseline bowel function  Description: INTERVENTIONS:  - Assess bowel function  - Encourage oral fluids to ensure adequate hydration  - Administer IV fluids if ordered to ensure adequate hydration  - Administer ordered medications as needed  - Encourage mobilization and activity  - Consider nutritional services referral to assist patient with adequate nutrition and appropriate food choices  Outcome: Progressing  Goal: Maintains adequate nutritional intake  Description: INTERVENTIONS:  - Monitor percentage of each meal consumed  - Identify factors contributing to decreased intake, treat as appropriate  - Assist with meals as needed  - Monitor I&O, weight, and lab values if indicated  - Obtain nutrition services referral as needed  Outcome: Progressing     Problem: METABOLIC, FLUID AND ELECTROLYTES - ADULT  Goal: Electrolytes maintained within normal limits  Description: INTERVENTIONS:  - Monitor labs and assess patient for signs and symptoms of electrolyte imbalances  - Administer electrolyte replacement as ordered  - Monitor response to electrolyte replacements, including repeat lab results as appropriate  - Instruct patient on fluid and nutrition as appropriate  Outcome: Progressing  Goal: Fluid balance maintained  Description: INTERVENTIONS:  - Monitor labs   - Monitor I/O and WT  - Instruct patient on fluid and nutrition as appropriate  - Assess for signs & symptoms of volume excess or deficit  Outcome: Progressing     Problem: HEMATOLOGIC - ADULT  Goal: Maintains hematologic stability  Description: INTERVENTIONS  - Assess for signs and symptoms of bleeding or hemorrhage  - Monitor labs  - Administer supportive blood products/factors as ordered and appropriate  Outcome: Progressing     Problem: PAIN - ADULT  Goal: Verbalizes/displays adequate comfort level or baseline comfort level  Description: Interventions:  - Encourage patient to monitor pain and request assistance  - Assess pain using appropriate pain scale  - Administer analgesics based on type and severity of pain and evaluate response  - Implement non-pharmacological measures as appropriate and evaluate response  - Consider cultural and social influences on pain and pain management  - Notify physician/advanced practitioner if interventions unsuccessful or patient reports new pain  Outcome: Progressing     Problem: INFECTION - ADULT  Goal: Absence or prevention of progression during hospitalization  Description: INTERVENTIONS:  - Assess and monitor for signs and symptoms of infection  - Monitor lab/diagnostic results  - Monitor all insertion sites, i e  indwelling lines, tubes, and drains  - Monitor endotracheal if appropriate and nasal secretions for changes in amount and color  - [place] appropriate cooling/warming therapies per order  - Administer medications as ordered  - Instruct and encourage patient and family to use good hand hygiene technique  - Identify and instruct in appropriate isolation precautions for identified infection/condition  Outcome: Progressing  Goal: Absence of fever/infection during neutropenic period  Description: INTERVENTIONS:  - Monitor WBC    Outcome: Progressing     Problem: SAFETY ADULT  Goal: Patient will remain free of falls  Description: INTERVENTIONS:  - Educate patient/family on patient safety including physical limitations  - Instruct patient to call for assistance with activity   - Consult OT/PT to assist with strengthening/mobility   - Keep Call bell within reach  - Keep bed low and locked with side rails adjusted as appropriate  - Keep care items and personal belongings within reach  - Initiate and maintain comfort rounds  - Make Fall Risk Sign visible to staff  - Apply yellow socks and bracelet for high fall risk patients  - Consider moving patient to room near nurses station  Outcome: Progressing  Goal: Maintain or return to baseline ADL function  Description: INTERVENTIONS:  -  Assess patient's ability to carry out ADLs; assess patient's baseline for ADL function and identify physical deficits which impact ability to perform ADLs (bathing, care of mouth/teeth, toileting, grooming, dressing, etc )  - Assess/evaluate cause of self-care deficits   - Assess range of motion  - Assess patient's mobility; develop plan if impaired  - Assess patient's need for assistive devices and provide as appropriate  - Encourage maximum independence but intervene and supervise when necessary  - Involve family in performance of ADLs  - Assess for home care needs following discharge   - Consider OT consult to assist with ADL evaluation and planning for discharge  - Provide patient education as appropriate  Outcome: Progressing  Goal: Maintains/Returns to pre admission functional level  Description: INTERVENTIONS:  - Perform BMAT or MOVE assessment daily    - Set and communicate daily mobility goal to care team and patient/family/caregiver  - Collaborate with rehabilitation services on mobility goals if consulted  - Out of bed for toileting  - Record patient progress and toleration of activity level   Outcome: Progressing     Problem: DISCHARGE PLANNING  Goal: Discharge to home or other facility with appropriate resources  Description: INTERVENTIONS:  - Identify barriers to discharge w/patient and caregiver  - Arrange for needed discharge resources and transportation as appropriate  - Identify discharge learning needs (meds, wound care, etc )  - Arrange for interpretive services to assist at discharge as needed  - Refer to Case Management Department for coordinating discharge planning if the patient needs post-hospital services based on physician/advanced practitioner order or complex needs related to functional status, cognitive ability, or social support system  Outcome: Progressing     Problem: Knowledge Deficit  Goal: Patient/family/caregiver demonstrates understanding of disease process, treatment plan, medications, and discharge instructions  Description: Complete learning assessment and assess knowledge base    Interventions:  - Provide teaching at level of understanding  - Provide teaching via preferred learning methods  Outcome: Progressing

## 2021-08-17 ENCOUNTER — ANESTHESIA EVENT (INPATIENT)
Dept: GASTROENTEROLOGY | Facility: HOSPITAL | Age: 61
DRG: 254 | End: 2021-08-17
Payer: COMMERCIAL

## 2021-08-17 ENCOUNTER — APPOINTMENT (INPATIENT)
Dept: GASTROENTEROLOGY | Facility: HOSPITAL | Age: 61
DRG: 254 | End: 2021-08-17
Attending: INTERNAL MEDICINE
Payer: COMMERCIAL

## 2021-08-17 ENCOUNTER — ANESTHESIA (INPATIENT)
Dept: GASTROENTEROLOGY | Facility: HOSPITAL | Age: 61
DRG: 254 | End: 2021-08-17
Payer: COMMERCIAL

## 2021-08-17 PROBLEM — E87.6 HYPOKALEMIA: Status: RESOLVED | Noted: 2021-08-15 | Resolved: 2021-08-17

## 2021-08-17 LAB
BASOPHILS # BLD AUTO: 0.04 THOUSANDS/ΜL (ref 0–0.1)
BASOPHILS NFR BLD AUTO: 1 % (ref 0–1)
EOSINOPHIL # BLD AUTO: 0.17 THOUSAND/ΜL (ref 0–0.61)
EOSINOPHIL NFR BLD AUTO: 2 % (ref 0–6)
ERYTHROCYTE [DISTWIDTH] IN BLOOD BY AUTOMATED COUNT: 14.5 % (ref 11.6–15.1)
HCT VFR BLD AUTO: 25.2 % (ref 36.5–49.3)
HCT VFR BLD AUTO: 27.8 % (ref 36.5–49.3)
HCT VFR BLD AUTO: 29.1 % (ref 36.5–49.3)
HGB BLD-MCNC: 8.5 G/DL (ref 12–17)
HGB BLD-MCNC: 9.4 G/DL (ref 12–17)
HGB BLD-MCNC: 9.6 G/DL (ref 12–17)
IMM GRANULOCYTES # BLD AUTO: 0.03 THOUSAND/UL (ref 0–0.2)
IMM GRANULOCYTES NFR BLD AUTO: 0 % (ref 0–2)
LYMPHOCYTES # BLD AUTO: 1.43 THOUSANDS/ΜL (ref 0.6–4.47)
LYMPHOCYTES NFR BLD AUTO: 17 % (ref 14–44)
MCH RBC QN AUTO: 29.9 PG (ref 26.8–34.3)
MCHC RBC AUTO-ENTMCNC: 33.8 G/DL (ref 31.4–37.4)
MCV RBC AUTO: 89 FL (ref 82–98)
MONOCYTES # BLD AUTO: 0.69 THOUSAND/ΜL (ref 0.17–1.22)
MONOCYTES NFR BLD AUTO: 8 % (ref 4–12)
NEUTROPHILS # BLD AUTO: 5.93 THOUSANDS/ΜL (ref 1.85–7.62)
NEUTS SEG NFR BLD AUTO: 72 % (ref 43–75)
NRBC BLD AUTO-RTO: 0 /100 WBCS
PLATELET # BLD AUTO: 260 THOUSANDS/UL (ref 149–390)
PMV BLD AUTO: 9.9 FL (ref 8.9–12.7)
RBC # BLD AUTO: 3.14 MILLION/UL (ref 3.88–5.62)
WBC # BLD AUTO: 8.29 THOUSAND/UL (ref 4.31–10.16)

## 2021-08-17 PROCEDURE — 99232 SBSQ HOSP IP/OBS MODERATE 35: CPT | Performed by: INTERNAL MEDICINE

## 2021-08-17 PROCEDURE — 85014 HEMATOCRIT: CPT | Performed by: FAMILY MEDICINE

## 2021-08-17 PROCEDURE — 85025 COMPLETE CBC W/AUTO DIFF WBC: CPT | Performed by: FAMILY MEDICINE

## 2021-08-17 PROCEDURE — 99232 SBSQ HOSP IP/OBS MODERATE 35: CPT | Performed by: NURSE PRACTITIONER

## 2021-08-17 PROCEDURE — 85018 HEMOGLOBIN: CPT | Performed by: FAMILY MEDICINE

## 2021-08-17 PROCEDURE — 0DJD8ZZ INSPECTION OF LOWER INTESTINAL TRACT, VIA NATURAL OR ARTIFICIAL OPENING ENDOSCOPIC: ICD-10-PCS | Performed by: INTERNAL MEDICINE

## 2021-08-17 RX ORDER — SODIUM CHLORIDE, SODIUM LACTATE, POTASSIUM CHLORIDE, CALCIUM CHLORIDE 600; 310; 30; 20 MG/100ML; MG/100ML; MG/100ML; MG/100ML
50 INJECTION, SOLUTION INTRAVENOUS CONTINUOUS
Status: DISCONTINUED | OUTPATIENT
Start: 2021-08-17 | End: 2021-08-17

## 2021-08-17 RX ORDER — PROPOFOL 10 MG/ML
INJECTION, EMULSION INTRAVENOUS CONTINUOUS PRN
Status: DISCONTINUED | OUTPATIENT
Start: 2021-08-17 | End: 2021-08-17

## 2021-08-17 RX ORDER — PROPOFOL 10 MG/ML
INJECTION, EMULSION INTRAVENOUS AS NEEDED
Status: DISCONTINUED | OUTPATIENT
Start: 2021-08-17 | End: 2021-08-17

## 2021-08-17 RX ORDER — SODIUM CHLORIDE, SODIUM LACTATE, POTASSIUM CHLORIDE, CALCIUM CHLORIDE 600; 310; 30; 20 MG/100ML; MG/100ML; MG/100ML; MG/100ML
INJECTION, SOLUTION INTRAVENOUS CONTINUOUS PRN
Status: DISCONTINUED | OUTPATIENT
Start: 2021-08-17 | End: 2021-08-17

## 2021-08-17 RX ORDER — CLOPIDOGREL BISULFATE 75 MG/1
75 TABLET ORAL DAILY
Status: DISCONTINUED | OUTPATIENT
Start: 2021-08-17 | End: 2021-08-18 | Stop reason: HOSPADM

## 2021-08-17 RX ORDER — LIDOCAINE HYDROCHLORIDE 10 MG/ML
INJECTION, SOLUTION EPIDURAL; INFILTRATION; INTRACAUDAL; PERINEURAL AS NEEDED
Status: DISCONTINUED | OUTPATIENT
Start: 2021-08-17 | End: 2021-08-17

## 2021-08-17 RX ORDER — EPHEDRINE SULFATE 50 MG/ML
INJECTION INTRAVENOUS AS NEEDED
Status: DISCONTINUED | OUTPATIENT
Start: 2021-08-17 | End: 2021-08-17

## 2021-08-17 RX ADMIN — PROPOFOL 30 MG: 10 INJECTION, EMULSION INTRAVENOUS at 14:31

## 2021-08-17 RX ADMIN — CLINDAMYCIN IN 5 PERCENT DEXTROSE 600 MG: 12 INJECTION, SOLUTION INTRAVENOUS at 05:13

## 2021-08-17 RX ADMIN — LIDOCAINE HYDROCHLORIDE 50 MG: 10 INJECTION, SOLUTION EPIDURAL; INFILTRATION; INTRACAUDAL; PERINEURAL at 14:03

## 2021-08-17 RX ADMIN — PROPOFOL 100 MG: 10 INJECTION, EMULSION INTRAVENOUS at 14:04

## 2021-08-17 RX ADMIN — IRON SUCROSE 300 MG: 20 INJECTION, SOLUTION INTRAVENOUS at 15:11

## 2021-08-17 RX ADMIN — PROPOFOL 50 MG: 10 INJECTION, EMULSION INTRAVENOUS at 14:03

## 2021-08-17 RX ADMIN — EPHEDRINE SULFATE 10 MG: 50 INJECTION, SOLUTION INTRAVENOUS at 14:24

## 2021-08-17 RX ADMIN — PROPOFOL 20 MG: 10 INJECTION, EMULSION INTRAVENOUS at 14:32

## 2021-08-17 RX ADMIN — CLINDAMYCIN IN 5 PERCENT DEXTROSE 600 MG: 12 INJECTION, SOLUTION INTRAVENOUS at 13:16

## 2021-08-17 RX ADMIN — Medication 40 MG: at 14:13

## 2021-08-17 RX ADMIN — ATORVASTATIN CALCIUM 40 MG: 40 TABLET, FILM COATED ORAL at 16:14

## 2021-08-17 RX ADMIN — EPHEDRINE SULFATE 10 MG: 50 INJECTION, SOLUTION INTRAVENOUS at 14:12

## 2021-08-17 RX ADMIN — SODIUM CHLORIDE, SODIUM LACTATE, POTASSIUM CHLORIDE, AND CALCIUM CHLORIDE: .6; .31; .03; .02 INJECTION, SOLUTION INTRAVENOUS at 13:45

## 2021-08-17 RX ADMIN — PROPOFOL 50 MG: 10 INJECTION, EMULSION INTRAVENOUS at 14:08

## 2021-08-17 RX ADMIN — PROPOFOL 120 MCG/KG/MIN: 10 INJECTION, EMULSION INTRAVENOUS at 14:03

## 2021-08-17 RX ADMIN — PANTOPRAZOLE SODIUM 40 MG: 40 TABLET, DELAYED RELEASE ORAL at 05:12

## 2021-08-17 RX ADMIN — CLOPIDOGREL BISULFATE 75 MG: 75 TABLET ORAL at 16:14

## 2021-08-17 RX ADMIN — EPHEDRINE SULFATE 10 MG: 50 INJECTION, SOLUTION INTRAVENOUS at 14:31

## 2021-08-17 NOTE — ASSESSMENT & PLAN NOTE
· May resume statin in light of GI bleed hold Plavix; resume when cleared by GI team   · Known prior hx of stroke back in June he still has residual right-sided blurry vision

## 2021-08-17 NOTE — ASSESSMENT & PLAN NOTE
· Was started on clindamycin back in August 7 for 10 days and completed on 8/17/2021  · Follow-up with dentist OP

## 2021-08-17 NOTE — ASSESSMENT & PLAN NOTE
· Symptomatic with exertional shortness of breath for the past week now with epigastric pain and melena  Suspect secondary to upper GI bleed    · Hold Plavix  · In [place] was 15 hemoglobin  · Iron panel reviewed   · IV venofer ordered X1 dose on 8/17

## 2021-08-17 NOTE — PROGRESS NOTES
[address]  Progress Note - Josianealejandromary Franklin 1960, 56 y o  male MRN: [id number]  Unit/Bed#: -Deneen Encounter: [id number]  Primary Care Provider: Sathish Martínez DO   Date and time admitted to hospital: 8/15/2021  4:35 PM    * Acute GI bleeding  Assessment & Plan  Background: Presented with gastric pain and melenic stools with associated acute anemia  On Plavix with CVA  in June  · Status post 2 units of PRBC   · Protonix gtt> PO daily per GI   · IVF discontinued   · Serial Hgb  · Cardiology cleared for procedure  · Gastroenterology consulted; input appreciated   · Status post EGD on 8/16 and colonoscopy on 8/17  · Awaiting final recommendations and stability of Hgb     Troponin level elevated  Assessment & Plan  · With no current chest pain and no EKG changes  F  · Likely secondary to demand ischemia in the setting of ABLA from GI losses  · Echo normal from June 2021   · Cardiology consulted; input as below   · Would benefit from OP work up  · Cleared for procedure   · Signed off     CVA [facility]re[facility] vascular accident[facility])  [address] [place] [address]  · May resume statin in light of GI bleed hold Plavix; resume when cleared by GI team   · Known prior hx of stroke back in June he still has residual right-sided blurry vision  Acute blood loss anemia  Assessment & Plan  · Symptomatic with exertional shortness of breath for the past week now with epigastric pain and melena  Suspect secondary to upper GI bleed  · Hold Plavix  · In June was 15 hemoglobin  · Iron panel reviewed   · IV venofer ordered X1 dose on 8/17    Pre-op exam  Assessment & Plan  · Cleared per Cardiovascular team     Dental abscess  Assessment & Plan  · Was started on clindamycin back in August 7 for 10 days and completed on 8/17/2021  · Follow-up with dentist OP    Transaminitis  Assessment & Plan  · In June he had negative LFTs he has been on Lipitor  · Will hold Lipitor and see if it resolves      · ALT > AST; if NOT > 5 times normal okay to continue Lipitor  · GI following; input as above     Prediabetes  Assessment & Plan  · Diagnosis last admission diet control    Hyperlipidemia  Assessment & Plan  · Continue statin therapy     Essential hypertension  Assessment & Plan  · Blood pressure is controlled in light of GI bleed  · Monitor closely following colonoscopy  · Avoid hypotension  · Monitor with routine vitals     Hypokalemia-resolved as of 2021  Assessment & Plan  · Resolved status post replacement       VTE Pharmacologic Prophylaxis: VTE Score: 2 Low Risk (Score 0-2) - Encourage Ambulation  Patient Centered Rounds: I performed bedside rounds with nursing staff today  Discussions with Specialists or Other Care Team Provider: nursing staff, CM and GI     Education and Discussions with Family / Patient: Patient declined call to   Time Spent for Care: 30 minutes  More than 50% of total time spent on counseling and coordination of care as described above  Current Length of Stay: 2 day(s)  Current Patient Status: Inpatient   Certification Statement: The patient will continue to require additional inpatient hospital stay due to colonoscopy to be done on   Discharge Plan: Anticipate discharge tomorrow to home  with GI clearance     Code Status: Level 1 - Full Code    Subjective:   Denied any abdominal pain or N/V    Objective:     Vitals:   Temp (24hrs), Av 1 °F (36 7 °C), Min:97 4 °F (36 3 °C), Max:98 7 °F (37 1 °C)    Temp:  [97 4 °F (36 3 °C)-98 7 °F (37 1 °C)] 98 2 °F (36 8 °C)  HR:  [70-84] 70  Resp:  [15-22] 22  BP: ()/(43-99) 88/50  SpO2:  [95 %-100 %] 100 %  Body mass index is 28 94 kg/m²  Input and Output Summary (last 24 hours):      Intake/Output Summary (Last 24 hours) at 2021 1449  Last data filed at 2021 1434  Gross per 24 hour   Intake  33 ml   Output 750 ml   Net 1255 33 ml       Physical Exam:   Physical Exam  Constitutional:       Appearance: Normal appearance  He is not ill-appearing or diaphoretic  Cardiovascular:      Rate and Rhythm: Normal rate and regular rhythm  Pulses: Normal pulses  Heart sounds: Normal heart sounds  Pulmonary:      Effort: Pulmonary effort is normal       Breath sounds: Normal breath sounds  Abdominal:      General: Bowel sounds are normal  There is no distension  Palpations: Abdomen is soft  Tenderness: There is no abdominal tenderness  Musculoskeletal:         General: No swelling  Skin:     General: Skin is warm and dry  Capillary Refill: Capillary refill takes less than 2 seconds  Neurological:      Mental Status: He is alert and oriented to person, place, and time  Mental status is at baseline  Psychiatric:         Mood and Affect: Mood normal          Behavior: Behavior normal          Judgment: Judgment normal           Additional Data:     Labs:  Results from last 7 days   Lab Units 08/17/21  0820 08/17/21  0519   WBC Thousand/uL  --  8 29   HEMOGLOBIN g/dL 9 6* 9 4*   HEMATOCRIT % 29 1* 27 8*   PLATELETS Thousands/uL  --  260   NEUTROS PCT %  --  72   LYMPHS PCT %  --  17   MONOS PCT %  --  8   EOS PCT %  --  2     Results from last 7 days   Lab Units 08/16/21  0522   SODIUM mmol/L 138   POTASSIUM mmol/L 3 7   CHLORIDE mmol/L 105   CO2 mmol/L 24   BUN mg/dL 16   CREATININE mg/dL 0 94   ANION GAP mmol/L 9   CALCIUM mg/dL 8 0*   ALBUMIN g/dL 2 9*   TOTAL BILIRUBIN mg/dL 0 41   ALK PHOS U/L 65   ALT U/L 102*   AST U/L 39   GLUCOSE RANDOM mg/dL 112     Results from last 7 days   Lab Units 08/15/21  1652   INR  0 99                   Lines/Drains:  Invasive Devices     Peripheral Intravenous Line            Peripheral IV 08/15/21 Distal;Dorsal (posterior); Left Forearm 1 day    Peripheral IV 08/15/21 Left Antecubital 1 day              Imaging: Reviewed radiology reports from this admission including: chest xray    Recent Cultures (last 7 days):         Last 24 Hours Medication List: Current Facility-Administered Medications   Medication Dose Route Frequency Provider Last Rate    acetaminophen  650 mg Oral Q6H PRN Shelia Palomares MD      atorvastatin  40 mg Oral Daily With Lazara Lua MD      ondansetron  4 mg Intravenous Q6H PRN Shelia Palomares MD      pantoprazole  40 mg Oral Early Morning Shelia Palomares MD      pneumococcal 13-valent conjugate vaccine  0 5 mL Intramuscular Prior to discharge Shelia Palomares MD          Today, Patient Was Seen By: JOON Cast    **Please Note: This note may have been constructed using a voice recognition system  **

## 2021-08-17 NOTE — ANESTHESIA POSTPROCEDURE EVALUATION
Post-Op Assessment Note    CV Status:  Stable  Pain Score: 0    Pain management: adequate     Mental Status:  Alert and awake   Hydration Status:  Euvolemic   PONV Controlled:  Controlled   Airway Patency:  Patent      Post Op Vitals Reviewed: Yes      Staff: CRNA         No complications documented      BP   88/53   Temp      Pulse 76   Resp 16   SpO2 99

## 2021-08-17 NOTE — ASSESSMENT & PLAN NOTE
· Blood pressure is controlled in light of GI bleed  · Monitor closely following colonoscopy  · Avoid hypotension  · Monitor with routine vitals

## 2021-08-17 NOTE — ASSESSMENT & PLAN NOTE
· With no current chest pain and no EKG changes    F  · Likely secondary to demand ischemia in the setting of ABLA from GI losses  · Echo normal from June 2021   · Cardiology consulted; input as below   · Would benefit from OP work up  · Cleared for procedure   · Signed off

## 2021-08-17 NOTE — NURSING NOTE
Patient refusing to continue bowel prep  RN encouraged and provided education  Patient refused  Cathryn Castle, [address] notified  Will continue to monitor

## 2021-08-17 NOTE — ASSESSMENT & PLAN NOTE
Background: Presented with gastric pain and melenic stools with associated acute anemia  On Plavix with CVA  in June    · Status post 2 units of PRBC   · Protonix gtt> PO daily per GI   · IVF discontinued   · Serial Hgb  · Cardiology cleared for procedure  · Gastroenterology consulted; input appreciated   · Status post EGD on 8/16 and colonoscopy on 8/17  · Awaiting final recommendations and stability of Hgb

## 2021-08-17 NOTE — ANESTHESIA PREPROCEDURE EVALUATION
Procedure:  COLONOSCOPY    Relevant Problems   ANESTHESIA (within normal limits)      CARDIO  Echo 60%EF, mild TR   (+) Essential hypertension   (+) Hyperlipidemia      ENDO (within normal limits)      GI/HEPATIC   (+) Acute GI bleeding      HEMATOLOGY   (+) Acute blood loss anemia      NEURO/PSYCH   (+) CVA (cerebral vascular accident) ([address] )      PULMONARY (within normal limits)        Physical Exam    Airway    Mallampati score: II  TM Distance: >3 FB  Neck ROM: full     Dental       Cardiovascular      Pulmonary      Other Findings        Anesthesia Plan  ASA Score- 3     Anesthesia Type- IV sedation with anesthesia with ASA Monitors  Additional Monitors:   Airway Plan:           Plan Factors-Exercise tolerance (METS): >4 METS  Chart reviewed  EKG reviewed  Existing labs reviewed  Patient summary reviewed  Patient is not a current smoker  Induction-     Postoperative Plan-     Informed Consent- Anesthetic plan and risks discussed with patient  I personally reviewed this patient with the CRNA  Discussed and agreed on the Anesthesia Plan with the CRNA  Alesia Vick

## 2021-08-17 NOTE — PLAN OF CARE
Problem: Potential for Falls  Goal: Patient will remain free of falls  Description: INTERVENTIONS:  - Educate patient/family on patient safety including physical limitations  - Instruct patient to call for assistance with activity   - Consult OT/PT to assist with strengthening/mobility   - Keep Call bell within reach  - Keep bed low and locked with side rails adjusted as appropriate  - Keep care items and personal belongings within reach  - Initiate and maintain comfort rounds  - Make Fall Risk Sign visible to staff  - Offer Toileting every   Hours, in advance of need  - Initiate/Maintain   alarm  - Obtain necessary fall risk management equipment:     - Apply yellow socks and bracelet for high fall risk patients  - Consider moving patient to room near nurses station  Outcome: Progressing     Problem: GASTROINTESTINAL - ADULT  Goal: Minimal or absence of nausea and/or vomiting  Description: INTERVENTIONS:  - Administer IV fluids if ordered to ensure adequate hydration  - Maintain NPO status until nausea and vomiting are resolved  - Nasogastric tube if ordered  - Administer ordered antiemetic medications as needed  - Provide nonpharmacologic comfort measures as appropriate  - Advance diet as tolerated, if ordered  - Consider nutrition services referral to assist patient with adequate nutrition and appropriate food choices  Outcome: Progressing  Goal: Maintains or returns to baseline bowel function  Description: INTERVENTIONS:  - Assess bowel function  - Encourage oral fluids to ensure adequate hydration  - Administer IV fluids if ordered to ensure adequate hydration  - Administer ordered medications as needed  - Encourage mobilization and activity  - Consider nutritional services referral to assist patient with adequate nutrition and appropriate food choices  Outcome: Progressing  Goal: Maintains adequate nutritional intake  Description: INTERVENTIONS:  - Monitor percentage of each meal consumed  - Identify factors contributing to decreased intake, treat as appropriate  - Assist with meals as needed  - Monitor I&O, weight, and lab values if indicated  - Obtain nutrition services referral as needed  Outcome: Progressing     Problem: METABOLIC, FLUID AND ELECTROLYTES - ADULT  Goal: Electrolytes maintained within normal limits  Description: INTERVENTIONS:  - Monitor labs and assess patient for signs and symptoms of electrolyte imbalances  - Administer electrolyte replacement as ordered  - Monitor response to electrolyte replacements, including repeat lab results as appropriate  - Instruct patient on fluid and nutrition as appropriate  Outcome: Progressing  Goal: Fluid balance maintained  Description: INTERVENTIONS:  - Monitor labs   - Monitor I/O and WT  - Instruct patient on fluid and nutrition as appropriate  - Assess for signs & symptoms of volume excess or deficit  Outcome: Progressing     Problem: HEMATOLOGIC - ADULT  Goal: Maintains hematologic stability  Description: INTERVENTIONS  - Assess for signs and symptoms of bleeding or hemorrhage  - Monitor labs  - Administer supportive blood products/factors as ordered and appropriate  Outcome: Progressing     Problem: PAIN - ADULT  Goal: Verbalizes/displays adequate comfort level or baseline comfort level  Description: Interventions:  - Encourage patient to monitor pain and request assistance  - Assess pain using appropriate pain scale  - Administer analgesics based on type and severity of pain and evaluate response  - Implement non-pharmacological measures as appropriate and evaluate response  - Consider cultural and social influences on pain and pain management  - Notify physician/advanced practitioner if interventions unsuccessful or patient reports new pain  Outcome: Progressing     Problem: INFECTION - ADULT  Goal: Absence or prevention of progression during hospitalization  Description: INTERVENTIONS:  - Assess and monitor for signs and symptoms of infection  - Monitor lab/diagnostic results  - Monitor all insertion sites, i e  indwelling lines, tubes, and drains  - Monitor endotracheal if appropriate and nasal secretions for changes in amount and color  - [place] appropriate cooling/warming therapies per order  - Administer medications as ordered  - Instruct and encourage patient and family to use good hand hygiene technique  - Identify and instruct in appropriate isolation precautions for identified infection/condition  Outcome: Progressing  Goal: Absence of fever/infection during neutropenic period  Description: INTERVENTIONS:  - Monitor WBC    Outcome: Progressing     Problem: SAFETY ADULT  Goal: Patient will remain free of falls  Description: INTERVENTIONS:  - Educate patient/family on patient safety including physical limitations  - Instruct patient to call for assistance with activity   - Consult OT/PT to assist with strengthening/mobility   - Keep Call bell within reach  - Keep bed low and locked with side rails adjusted as appropriate  - Keep care items and personal belongings within reach  - Initiate and maintain comfort rounds  - Make Fall Risk Sign visible to staff  - Offer Toileting every   Hours, in advance of need  - Initiate/Maintain   alarm  - Obtain necessary fall risk management equipment:     - Apply yellow socks and bracelet for high fall risk patients  - Consider moving patient to room near nurses station  Outcome: Progressing  Goal: Maintain or return to baseline ADL function  Description: INTERVENTIONS:  -  Assess patient's ability to carry out ADLs; assess patient's baseline for ADL function and identify physical deficits which impact ability to perform ADLs (bathing, care of mouth/teeth, toileting, grooming, dressing, etc )  - Assess/evaluate cause of self-care deficits   - Assess range of motion  - Assess patient's mobility; develop plan if impaired  - Assess patient's need for assistive devices and provide as appropriate  - Encourage maximum independence but intervene and supervise when necessary  - Involve family in performance of ADLs  - Assess for home care needs following discharge   - Consider OT consult to assist with ADL evaluation and planning for discharge  - Provide patient education as appropriate  Outcome: Progressing  Goal: Maintains/Returns to pre admission functional level  Description: INTERVENTIONS:  - Perform BMAT or MOVE assessment daily    - Set and communicate daily mobility goal to care team and patient/family/caregiver  - Collaborate with rehabilitation services on mobility goals if consulted  - Perform Range of Motion   times a day  - Reposition patient every   hours  - Dangle patient   times a day  - Stand patient   times a day  - Ambulate patient   times a day  - Out of bed to chair   times a day   - Out of bed for meals   times a day  - Out of bed for toileting  - Record patient progress and toleration of activity level   Outcome: Progressing     Problem: DISCHARGE PLANNING  Goal: Discharge to home or other facility with appropriate resources  Description: INTERVENTIONS:  - Identify barriers to discharge w/patient and caregiver  - Arrange for needed discharge resources and transportation as appropriate  - Identify discharge learning needs (meds, wound care, etc )  - Arrange for interpretive services to assist at discharge as needed  - Refer to Case Management Department for coordinating discharge planning if the patient needs post-hospital services based on physician/advanced practitioner order or complex needs related to functional status, cognitive ability, or social support system  Outcome: Progressing     Problem: Knowledge Deficit  Goal: Patient/family/caregiver demonstrates understanding of disease process, treatment plan, medications, and discharge instructions  Description: Complete learning assessment and assess knowledge base    Interventions:  - Provide teaching at level of understanding  - Provide teaching via preferred learning methods  Outcome: Progressing

## 2021-08-17 NOTE — ASSESSMENT & PLAN NOTE
· In June he had negative LFTs he has been on Lipitor  · Will hold Lipitor and see if it resolves      · ALT > AST; if NOT > 5 times normal okay to continue Lipitor  · GI following; input as above

## 2021-08-17 NOTE — PROGRESS NOTES
Patient is alert and oriented  Independent in room no incautious behavior  Patient having liquid black stools recorded on stool record  Patient denied dizziness  Patient drank 32 oz of bowel prep and then vomited twice  PRN zofran given at 2200  Patient stated he does not want to drink any more prep  Nurse encouraged client to take prn zofran to see if nausea improves and can take bowel prep

## 2021-08-17 NOTE — PLAN OF CARE
Problem: Potential for Falls  Goal: Patient will remain free of falls  Description: INTERVENTIONS:  - Educate patient/family on patient safety including physical limitations  - Instruct patient to call for assistance with activity   - Consult OT/PT to assist with strengthening/mobility   - Keep Call bell within reach  - Keep bed low and locked with side rails adjusted as appropriate  - Keep care items and personal belongings within reach  - Initiate and maintain comfort rounds  - Make Fall Risk Sign visible to staff  - Obtain necessary fall risk management equipment:   - Apply yellow socks and bracelet for high fall risk patients  - Consider moving patient to room near nurses station  Outcome: Progressing     Problem: GASTROINTESTINAL - ADULT  Goal: Minimal or absence of nausea and/or vomiting  Description: INTERVENTIONS:  - Administer IV fluids if ordered to ensure adequate hydration  - Maintain NPO status until nausea and vomiting are resolved  - Nasogastric tube if ordered  - Administer ordered antiemetic medications as needed  - Provide nonpharmacologic comfort measures as appropriate  - Advance diet as tolerated, if ordered  - Consider nutrition services referral to assist patient with adequate nutrition and appropriate food choices  Outcome: Progressing  Goal: Maintains or returns to baseline bowel function  Description: INTERVENTIONS:  - Assess bowel function  - Encourage oral fluids to ensure adequate hydration  - Administer IV fluids if ordered to ensure adequate hydration  - Administer ordered medications as needed  - Encourage mobilization and activity  - Consider nutritional services referral to assist patient with adequate nutrition and appropriate food choices  Outcome: Progressing  Goal: Maintains adequate nutritional intake  Description: INTERVENTIONS:  - Monitor percentage of each meal consumed  - Identify factors contributing to decreased intake, treat as appropriate  - Assist with meals as needed  - Monitor I&O, weight, and lab values if indicated  - Obtain nutrition services referral as needed  Outcome: Progressing     Problem: METABOLIC, FLUID AND ELECTROLYTES - ADULT  Goal: Electrolytes maintained within normal limits  Description: INTERVENTIONS:  - Monitor labs and assess patient for signs and symptoms of electrolyte imbalances  - Administer electrolyte replacement as ordered  - Monitor response to electrolyte replacements, including repeat lab results as appropriate  - Instruct patient on fluid and nutrition as appropriate  Outcome: Progressing  Goal: Fluid balance maintained  Description: INTERVENTIONS:  - Monitor labs   - Monitor I/O and WT  - Instruct patient on fluid and nutrition as appropriate  - Assess for signs & symptoms of volume excess or deficit  Outcome: Progressing     Problem: HEMATOLOGIC - ADULT  Goal: Maintains hematologic stability  Description: INTERVENTIONS  - Assess for signs and symptoms of bleeding or hemorrhage  - Monitor labs  - Administer supportive blood products/factors as ordered and appropriate  Outcome: Progressing     Problem: PAIN - ADULT  Goal: Verbalizes/displays adequate comfort level or baseline comfort level  Description: Interventions:  - Encourage patient to monitor pain and request assistance  - Assess pain using appropriate pain scale  - Administer analgesics based on type and severity of pain and evaluate response  - Implement non-pharmacological measures as appropriate and evaluate response  - Consider cultural and social influences on pain and pain management  - Notify physician/advanced practitioner if interventions unsuccessful or patient reports new pain  Outcome: Progressing     Problem: INFECTION - ADULT  Goal: Absence or prevention of progression during hospitalization  Description: INTERVENTIONS:  - Assess and monitor for signs and symptoms of infection  - Monitor lab/diagnostic results  - Monitor all insertion sites, i e  indwelling lines, tubes, and drains  - Monitor endotracheal if appropriate and nasal secretions for changes in amount and color  - [place] appropriate cooling/warming therapies per order  - Administer medications as ordered  - Instruct and encourage patient and family to use good hand hygiene technique  - Identify and instruct in appropriate isolation precautions for identified infection/condition  Outcome: Progressing  Goal: Absence of fever/infection during neutropenic period  Description: INTERVENTIONS:  - Monitor WBC    Outcome: Progressing     Problem: SAFETY ADULT  Goal: Patient will remain free of falls  Description: INTERVENTIONS:  - Educate patient/family on patient safety including physical limitations  - Instruct patient to call for assistance with activity   - Consult OT/PT to assist with strengthening/mobility   - Keep Call bell within reach  - Keep bed low and locked with side rails adjusted as appropriate  - Keep care items and personal belongings within reach  - Initiate and maintain comfort rounds  - Make Fall Risk Sign visible to staff    - Obtain necessary fall risk management equipment:   - Apply yellow socks and bracelet for high fall risk patients  - Consider moving patient to room near nurses station  Outcome: Progressing  Goal: Maintain or return to baseline ADL function  Description: INTERVENTIONS:  -  Assess patient's ability to carry out ADLs; assess patient's baseline for ADL function and identify physical deficits which impact ability to perform ADLs (bathing, care of mouth/teeth, toileting, grooming, dressing, etc )  - Assess/evaluate cause of self-care deficits   - Assess range of motion  - Assess patient's mobility; develop plan if impaired  - Assess patient's need for assistive devices and provide as appropriate  - Encourage maximum independence but intervene and supervise when necessary  - Involve family in performance of ADLs  - Assess for home care needs following discharge   - Consider OT consult to assist with ADL evaluation and planning for discharge  - Provide patient education as appropriate  Outcome: Progressing  Goal: Maintains/Returns to pre admission functional level  Description: INTERVENTIONS:  - Perform BMAT or MOVE assessment daily    - Set and communicate daily mobility goal to care team and patient/family/caregiver  - Collaborate with rehabilitation services on mobility goals if consulted  - Perform Range of Motion 3 times a day  - Dangle patient 3 times a day  - Stand patient 3 times a day  - Ambulate patient 3 times a day  - Out of bed to chair 3 times a day   - Out of bed for meals 3 times a day  - Out of bed for toileting  - Record patient progress and toleration of activity level   Outcome: Progressing     Problem: DISCHARGE PLANNING  Goal: Discharge to home or other facility with appropriate resources  Description: INTERVENTIONS:  - Identify barriers to discharge w/patient and caregiver  - Arrange for needed discharge resources and transportation as appropriate  - Identify discharge learning needs (meds, wound care, etc )  - Arrange for interpretive services to assist at discharge as needed  - Refer to Case Management Department for coordinating discharge planning if the patient needs post-hospital services based on physician/advanced practitioner order or complex needs related to functional status, cognitive ability, or social support system  Outcome: Progressing     Problem: Knowledge Deficit  Goal: Patient/family/caregiver demonstrates understanding of disease process, treatment plan, medications, and discharge instructions  Description: Complete learning assessment and assess knowledge base    Interventions:  - Provide teaching at level of understanding  - Provide teaching via preferred learning methods  Outcome: Progressing

## 2021-08-18 VITALS
RESPIRATION RATE: 16 BRPM | DIASTOLIC BLOOD PRESSURE: 88 MMHG | TEMPERATURE: 98.6 F | WEIGHT: 196 LBS | OXYGEN SATURATION: 95 % | BODY MASS INDEX: 29.03 KG/M2 | HEART RATE: 80 BPM | HEIGHT: 69 IN | SYSTOLIC BLOOD PRESSURE: 125 MMHG

## 2021-08-18 LAB
ANION GAP SERPL CALCULATED.3IONS-SCNC: 8 MMOL/L (ref 4–13)
BASOPHILS # BLD AUTO: 0.03 THOUSANDS/ΜL (ref 0–0.1)
BASOPHILS NFR BLD AUTO: 1 % (ref 0–1)
BUN SERPL-MCNC: 9 MG/DL (ref 5–25)
CALCIUM SERPL-MCNC: 8.2 MG/DL (ref 8.3–10.1)
CHLORIDE SERPL-SCNC: 107 MMOL/L (ref 100–108)
CO2 SERPL-SCNC: 27 MMOL/L (ref 21–32)
CREAT SERPL-MCNC: 1.02 MG/DL (ref 0.6–1.3)
EOSINOPHIL # BLD AUTO: 0.14 THOUSAND/ΜL (ref 0–0.61)
EOSINOPHIL NFR BLD AUTO: 2 % (ref 0–6)
ERYTHROCYTE [DISTWIDTH] IN BLOOD BY AUTOMATED COUNT: 14.4 % (ref 11.6–15.1)
GFR SERPL CREATININE-BSD FRML MDRD: 78 ML/MIN/1.73SQ M
GLUCOSE SERPL-MCNC: 102 MG/DL (ref 65–140)
HCT VFR BLD AUTO: 26.4 % (ref 36.5–49.3)
HCT VFR BLD AUTO: 29.6 % (ref 36.5–49.3)
HGB BLD-MCNC: 8.7 G/DL (ref 12–17)
HGB BLD-MCNC: 9.6 G/DL (ref 12–17)
IMM GRANULOCYTES # BLD AUTO: 0.02 THOUSAND/UL (ref 0–0.2)
IMM GRANULOCYTES NFR BLD AUTO: 0 % (ref 0–2)
LYMPHOCYTES # BLD AUTO: 1.12 THOUSANDS/ΜL (ref 0.6–4.47)
LYMPHOCYTES NFR BLD AUTO: 17 % (ref 14–44)
MCH RBC QN AUTO: 29.3 PG (ref 26.8–34.3)
MCHC RBC AUTO-ENTMCNC: 33 G/DL (ref 31.4–37.4)
MCV RBC AUTO: 89 FL (ref 82–98)
MONOCYTES # BLD AUTO: 0.63 THOUSAND/ΜL (ref 0.17–1.22)
MONOCYTES NFR BLD AUTO: 10 % (ref 4–12)
NEUTROPHILS # BLD AUTO: 4.61 THOUSANDS/ΜL (ref 1.85–7.62)
NEUTS SEG NFR BLD AUTO: 70 % (ref 43–75)
NRBC BLD AUTO-RTO: 0 /100 WBCS
PLATELET # BLD AUTO: 247 THOUSANDS/UL (ref 149–390)
PMV BLD AUTO: 9.9 FL (ref 8.9–12.7)
POTASSIUM SERPL-SCNC: 3.8 MMOL/L (ref 3.5–5.3)
RBC # BLD AUTO: 2.97 MILLION/UL (ref 3.88–5.62)
SODIUM SERPL-SCNC: 142 MMOL/L (ref 136–145)
WBC # BLD AUTO: 6.55 THOUSAND/UL (ref 4.31–10.16)

## 2021-08-18 PROCEDURE — 80048 BASIC METABOLIC PNL TOTAL CA: CPT | Performed by: NURSE PRACTITIONER

## 2021-08-18 PROCEDURE — 99239 HOSP IP/OBS DSCHRG MGMT >30: CPT | Performed by: NURSE PRACTITIONER

## 2021-08-18 PROCEDURE — 85025 COMPLETE CBC W/AUTO DIFF WBC: CPT | Performed by: NURSE PRACTITIONER

## 2021-08-18 PROCEDURE — 85018 HEMOGLOBIN: CPT | Performed by: FAMILY MEDICINE

## 2021-08-18 PROCEDURE — 85014 HEMATOCRIT: CPT | Performed by: FAMILY MEDICINE

## 2021-08-18 RX ORDER — FERROUS SULFATE 325(65) MG
325 TABLET ORAL 2 TIMES DAILY WITH MEALS
Status: DISCONTINUED | OUTPATIENT
Start: 2021-08-18 | End: 2021-08-18 | Stop reason: HOSPADM

## 2021-08-18 RX ORDER — AMOXICILLIN 250 MG
1 CAPSULE ORAL 2 TIMES DAILY
Status: DISCONTINUED | OUTPATIENT
Start: 2021-08-18 | End: 2021-08-18 | Stop reason: HOSPADM

## 2021-08-18 RX ORDER — AMOXICILLIN 250 MG
1 CAPSULE ORAL 2 TIMES DAILY
Qty: 60 TABLET | Refills: 0 | Status: SHIPPED | OUTPATIENT
Start: 2021-08-18 | End: 2021-09-17

## 2021-08-18 RX ORDER — FERROUS SULFATE 325(65) MG
325 TABLET ORAL 2 TIMES DAILY WITH MEALS
Qty: 60 TABLET | Refills: 0 | Status: SHIPPED | OUTPATIENT
Start: 2021-08-18 | End: 2021-09-17

## 2021-08-18 RX ORDER — AMOXICILLIN 250 MG
1 CAPSULE ORAL 2 TIMES DAILY
Qty: 60 TABLET | Refills: 0 | Status: CANCELLED | OUTPATIENT
Start: 2021-08-18 | End: 2021-09-17

## 2021-08-18 RX ORDER — FERROUS SULFATE 325(65) MG
325 TABLET ORAL 2 TIMES DAILY WITH MEALS
Qty: 60 TABLET | Refills: 0 | Status: CANCELLED | OUTPATIENT
Start: 2021-08-18 | End: 2021-09-17

## 2021-08-18 RX ORDER — PANTOPRAZOLE SODIUM 40 MG/1
40 TABLET, DELAYED RELEASE ORAL
Qty: 30 TABLET | Refills: 0 | Status: SHIPPED | OUTPATIENT
Start: 2021-08-19 | End: 2021-09-18

## 2021-08-18 RX ADMIN — FERROUS SULFATE TAB 325 MG (65 MG ELEMENTAL FE) 325 MG: 325 (65 FE) TAB at 09:18

## 2021-08-18 RX ADMIN — PANTOPRAZOLE SODIUM 40 MG: 40 TABLET, DELAYED RELEASE ORAL at 05:00

## 2021-08-18 RX ADMIN — CLOPIDOGREL BISULFATE 75 MG: 75 TABLET ORAL at 09:18

## 2021-08-18 RX ADMIN — DOCUSATE SODIUM AND SENNOSIDES 1 TABLET: 8.6; 5 TABLET ORAL at 09:18

## 2021-08-18 NOTE — NURSING NOTE
Peripheral IV removed  Belongings reviewed  AVS printed and reviewed with pt  Verbalized understanding  Son at bedside and taking pt home  Pt ambulating self out of hospital, steady on foot  Scripts sent electronically  No medications to return

## 2021-08-18 NOTE — ASSESSMENT & PLAN NOTE
· Symptomatic with exertional shortness of breath for the past week now with epigastric pain and melena  Suspect secondary to upper GI bleed    · Hold Plavix  · In [place] was 15 hemoglobin  · Iron panel reviewed   · IV venofer ordered X1 dose on 8/17  · Ferrous sulfate and bowel regimen ordered at time of discharge

## 2021-08-18 NOTE — DISCHARGE SUMMARY
[address]  Discharge- Rolan Barnes 1960, 56 y o  male MRN: [id number]  Unit/Bed#: -[facility] Encounter: [id number]  Primary Care Provider: Kenny Lancaster DO   Date and time admitted to hospital: 8/15/2021  4:35 PM    * Acute GI bleeding  Assessment & Plan  Background: Presented with gastric pain and melenic stools with associated acute anemia  On Plavix with CVA  in June  · Status post 2 units of PRBC   · Protonix gtt> PO daily per GI   · IVF discontinued   · Serial Hgb  · Cardiology cleared for procedure  · Gastroenterology consulted; input appreciated   · Status post EGD on 8/16 and colonoscopy on 8/17  · No active source of bleeding  · Recommended ferrous sulfate supplementation  · Status post 1 dose of IV iron  · Will need outpatient follow-up with primary gastroenterologist Dr Jayjay Key for repeat colonoscopy in approximately 3 months as patient was on Plavix and unable to take biopsy of polyp  Troponin level elevated  Assessment & Plan  · With no current chest pain and no EKG changes  F  · Likely secondary to demand ischemia in the setting of ABLA from GI losses  · Echo normal from June 2021   · Cardiology consulted; input as below   · Would benefit from OP work up  · Cleared for procedure   · Signed off     CVA (cerebral vascular accident) [facility])  [address] [place] [address]  · May resume statin in light of GI bleed hold Plavix; resume when cleared by GI team   · Known prior hx of stroke back in June he still has residual right-sided blurry vision  Acute blood loss anemia  Assessment & Plan  · Symptomatic with exertional shortness of breath for the past week now with epigastric pain and melena  Suspect secondary to upper GI bleed    · Hold Plavix  · In June was 15 hemoglobin  · Iron panel reviewed   · IV venofer ordered X1 dose on 8/17  · Ferrous sulfate and bowel regimen ordered at time of discharge    Pre-op exam  Assessment & Plan  · Cleared per Cardiovascular team Dental abscess  Assessment & Plan  · Was started on clindamycin back in August 7 for 10 days and completed on 8/17/2021  · Follow-up with dentist OP    Transaminitis  Assessment & Plan  · In June he had negative LFTs he has been on Lipitor  · Will hold Lipitor and see if it resolves      · ALT > AST; if NOT > 5 times normal okay to continue Lipitor  · GI following; input as above     Prediabetes  Assessment & Plan  · Diagnosis last admission diet control    Hyperlipidemia  Assessment & Plan  · Continue statin therapy     Essential hypertension  Assessment & Plan  · Blood pressure is controlled in light of GI bleed  · Monitor closely following colonoscopy  · Avoid hypotension  · Monitor with routine vitals     Medical Problems     Resolved Problems  Date Reviewed: 8/18/2021        Resolved    Hypokalemia 8/17/2021     Resolved by  Haydee Cooley              Discharging Physician / Practitioner: Haydee Cooley  PCP: Vickie Haile DO  Admission Date:   Admission Orders (From admission, onward)     Ordered        08/15/21 [facility]  Once                   Discharge Date: 08/18/21    Consultations During Hospital Stay:  · Gastroenterology  · Cardiology    Procedures Performed:   EGDResult Date: 8/16/2021  Impression: No active bleeding Normal esophagus Erosive gastritis/erosive duodenitis-biopsies taken to rule out H pylori RECOMMENDATION: Await pathology results Return to floor for ongoing care as per primary team Change Protonix infusion to Protonix once daily Clear liquid diet Prep tonight for colonoscopy tomorrow  Jessie Dejesus MD   ColonoscopyResult Date: 8/17/2021  Impression: No active bleeding or stigmata of recent hemorrhage Normal terminal ileum 12 mm flat ascending colon polyp which was not removed due to patient being on Plavix 5 mm sessile polyp in transverse colon which was not removed due to patient being on Plavix Small internal hemorrhoids RECOMMENDATION: Repeat colonoscopy in 3 months due to a personal history of colon polyps  Return to [facility] for ongoing care Okay to resume regular diet Okay to resume Plavix IV iron supplementation and oral iron upon discharge Follow-up with primary gastroenterologist-Dr Mauricio Sheridan for repeat elective colonoscopy removal of polyps when safe to take off Plavix Sathish Tavera MD<br>Davidson Lozano MD   XR chest 1 view portableResult Date: 8/16/2021  · Impression: No acute cardiopulmonary disease  This report is in agreement with the preliminary interpretation  Workstation performed: [id number]     Significant Findings / Test Results:   · As noted above    Incidental Findings:   · None     Test Results Pending at Discharge (will require follow up): · None     Outpatient Tests Requested:  · None    Complications:  None    Reason for Admission:  Epigastric pain    Hospital Course:   Kvng Murrell is a 56 y o  male patient with past medical history of CVA in June 2021, hypertension, hyperlipidemia who originally presented to the hospital on 8/15/2021 due to epigastric pain  He reported melenic stools  He was started on a Protonix drip and IV fluids as well as 2 units of packed red blood cells  Gastroenterology did see and evaluate and performed EGD and colonoscopy as above  No active source of bleeding at that time  Recommended iron supplementation and close outpatient follow-up with primary gastroenterology team     Given elevated troponin likely in the setting of demand cardiology was consulted prior to anesthesia for clearance  Recommended outpatient follow-up with cardiology team   All recommendations discussed with patient prior to discharge and patient reported agreement  Please see above list of diagnoses and related plan for additional information       Condition at Discharge: stable    Discharge Day Visit / Exam:   Subjective:  Denies any chest pain, shortness of breath, abdominal pain, or bloody bowel movements  Vitals: Blood Pressure: 125/88 (08/18/21 0752)  Pulse: 80 (08/18/21 0752)  Temperature: 98 6 °F (37 °C) (08/18/21 0752)  Temp Source: Oral (08/17/21 1341)  Respirations: 16 (08/18/21 0752)  Height: 5' 9" (175 3 cm) (08/17/21 1341)  Weight - Scale: 88 9 kg (196 lb) (08/17/21 1341)  SpO2: 95 % (08/18/21 0752)  Exam:   Physical Exam  Constitutional:       Appearance: Normal appearance  He is not ill-appearing or diaphoretic  Cardiovascular:      Rate and Rhythm: Normal rate and regular rhythm  Pulses: Normal pulses  Heart sounds: Normal heart sounds  Pulmonary:      Effort: Pulmonary effort is normal       Breath sounds: Normal breath sounds  Abdominal:      General: Bowel sounds are normal  There is no distension  Palpations: Abdomen is soft  Tenderness: There is no abdominal tenderness  Musculoskeletal:         General: No swelling  Skin:     General: Skin is warm and dry  Capillary Refill: Capillary refill takes less than 2 seconds  Neurological:      Mental Status: He is alert and oriented to person, place, and time  Mental status is at baseline  Psychiatric:         Mood and Affect: Mood normal          Behavior: Behavior normal          Judgment: Judgment normal        Discussion with Family: Patient declined call to   Discharge instructions/Information to patient and family:   See after visit summary for information provided to patient and family  Provisions for Follow-Up Care:  See after visit summary for information related to follow-up care and any pertinent home health orders  Disposition:   Home    Discharge Statement:  I spent 45 minutes discharging the patient  This time was spent on the day of discharge  I had direct contact with the patient on the day of discharge   Greater than 50% of the total time was spent examining patient, answering all patient questions, arranging and discussing plan of care with patient as well as directly providing post-discharge instructions  Additional time then spent on discharge activities  Discharge Medications:  See after visit summary for reconciled discharge medications provided to patient and/or family        **Please Note: This note may have been constructed using a voice recognition system**

## 2021-08-18 NOTE — PLAN OF CARE
Problem: INFECTION - ADULT  Goal: Absence or prevention of progression during hospitalization  Description: INTERVENTIONS:  - Assess and monitor for signs and symptoms of infection  - Monitor lab/diagnostic results  - Monitor all insertion sites, i e  indwelling lines, tubes, and drains  - Monitor endotracheal if appropriate and nasal secretions for changes in amount and color  - [place] appropriate cooling/warming therapies per order  - Administer medications as ordered  - Instruct and encourage patient and family to use good hand hygiene technique  - Identify and instruct in appropriate isolation precautions for identified infection/condition  Outcome: Progressing  Goal: Absence of fever/infection during neutropenic period  Description: INTERVENTIONS:  - Monitor WBC    Outcome: Progressing     Problem: SAFETY ADULT  Goal: Patient will remain free of falls  Description: INTERVENTIONS:  - Educate patient/family on patient safety including physical limitations  - Instruct patient to call for assistance with activity   - Consult OT/PT to assist with strengthening/mobility   - Keep Call bell within reach  - Keep bed low and locked with side rails adjusted as appropriate  - Keep care items and personal belongings within reach  - Initiate and maintain comfort rounds  - Make Fall Risk Sign visible to staff  - Apply yellow socks and bracelet for high fall risk patients  - Consider moving patient to room near nurses station  Outcome: Progressing  Goal: Maintain or return to baseline ADL function  Description: INTERVENTIONS:  -  Assess patient's ability to carry out ADLs; assess patient's baseline for ADL function and identify physical deficits which impact ability to perform ADLs (bathing, care of mouth/teeth, toileting, grooming, dressing, etc )  - Assess/evaluate cause of self-care deficits   - Assess range of motion  - Assess patient's mobility; develop plan if impaired  - Assess patient's need for assistive devices and provide as appropriate  - Encourage maximum independence but intervene and supervise when necessary  - Involve family in performance of ADLs  - Assess for home care needs following discharge   - Consider OT consult to assist with ADL evaluation and planning for discharge  - Provide patient education as appropriate  Outcome: Progressing  Goal: Maintains/Returns to pre admission functional level  Description: INTERVENTIONS:  - Perform BMAT or MOVE assessment daily    - Set and communicate daily mobility goal to care team and patient/family/caregiver  - Collaborate with rehabilitation services on mobility goals if consulted  - Out of bed for toileting  - Record patient progress and toleration of activity level   Outcome: Progressing     Problem: DISCHARGE PLANNING  Goal: Discharge to home or other facility with appropriate resources  Description: INTERVENTIONS:  - Identify barriers to discharge w/patient and caregiver  - Arrange for needed discharge resources and transportation as appropriate  - Identify discharge learning needs (meds, wound care, etc )  - Arrange for interpretive services to assist at discharge as needed  - Refer to Case Management Department for coordinating discharge planning if the patient needs post-hospital services based on physician/advanced practitioner order or complex needs related to functional status, cognitive ability, or social support system  Outcome: Progressing     Problem: Knowledge Deficit  Goal: Patient/family/caregiver demonstrates understanding of disease process, treatment plan, medications, and discharge instructions  Description: Complete learning assessment and assess knowledge base    Interventions:  - Provide teaching at level of understanding  - Provide teaching via preferred learning methods  Outcome: Progressing

## 2021-08-18 NOTE — ASSESSMENT & PLAN NOTE
Background: Presented with gastric pain and melenic stools with associated acute anemia  On Plavix with CVA  in June  · Status post 2 units of PRBC   · Protonix gtt> PO daily per GI   · IVF discontinued   · Serial Hgb  · Cardiology cleared for procedure  · Gastroenterology consulted; input appreciated   · Status post EGD on 8/16 and colonoscopy on 8/17  · No active source of bleeding  · Recommended ferrous sulfate supplementation  · Status post 1 dose of IV iron  · Will need outpatient follow-up with primary gastroenterologist Dr Erna Reynolds for repeat colonoscopy in approximately 3 months as patient was on Plavix and unable to take biopsy of polyp

## 2021-08-22 LAB
ATRIAL RATE: 103 BPM
P AXIS: 57 DEGREES
PR INTERVAL: 154 MS
QRS AXIS: 28 DEGREES
QRSD INTERVAL: 98 MS
QT INTERVAL: 358 MS
QTC INTERVAL: 468 MS
T WAVE AXIS: 54 DEGREES
VENTRICULAR RATE: 103 BPM

## 2021-08-22 PROCEDURE — 93010 ELECTROCARDIOGRAM REPORT: CPT | Performed by: INTERNAL MEDICINE

## 2024-12-01 ENCOUNTER — APPOINTMENT (EMERGENCY)
Dept: RADIOLOGY | Facility: HOSPITAL | Age: 64
End: 2024-12-01
Payer: COMMERCIAL

## 2024-12-01 ENCOUNTER — HOSPITAL ENCOUNTER (EMERGENCY)
Facility: HOSPITAL | Age: 64
Discharge: HOME | End: 2024-12-01
Attending: EMERGENCY MEDICINE
Payer: COMMERCIAL

## 2024-12-01 VITALS
BODY MASS INDEX: 31.87 KG/M2 | SYSTOLIC BLOOD PRESSURE: 155 MMHG | RESPIRATION RATE: 17 BRPM | TEMPERATURE: 98.4 F | WEIGHT: 215.83 LBS | DIASTOLIC BLOOD PRESSURE: 99 MMHG | OXYGEN SATURATION: 95 % | HEART RATE: 66 BPM

## 2024-12-01 DIAGNOSIS — S49.91XA INJURY OF RIGHT ACROMIOCLAVICULAR JOINT, INITIAL ENCOUNTER: Primary | ICD-10-CM

## 2024-12-01 PROCEDURE — 73030 X-RAY EXAM OF SHOULDER: CPT

## 2024-12-01 PROCEDURE — 99283 EMERGENCY DEPT VISIT LOW MDM: CPT

## 2024-12-01 PROCEDURE — 99284 EMERGENCY DEPT VISIT MOD MDM: CPT | Performed by: EMERGENCY MEDICINE

## 2024-12-01 RX ORDER — LIDOCAINE 50 MG/G
1 PATCH TOPICAL DAILY
Qty: 7 PATCH | Refills: 0 | Status: SHIPPED | OUTPATIENT
Start: 2024-12-01 | End: 2024-12-08

## 2024-12-01 NOTE — ED PROVIDER NOTES
Time reflects when diagnosis was documented in both MDM as applicable and the Disposition within this note       Time User Action Codes Description Comment    12/1/2024  6:38 AM Maximo Lundy Add [S49.91XA] Injury of right acromioclavicular joint, initial encounter           ED Disposition       ED Disposition   Discharge    Condition   Stable    Date/Time   Sun Dec 1, 2024  6:38 AM    Comment   Salvatore Foster discharge to home/self care.                   Assessment & Plan       Medical Decision Making  Amount and/or Complexity of Data Reviewed  Radiology: ordered and independent interpretation performed. Decision-making details documented in ED Course.  Discussion of management or test interpretation with external provider(s): At risk for but not limited to clavicle fracture, AC joint strain, AC separation, rotator cuff injury, shoulder dislocation, shoulder fracture             Medications - No data to display    ED Risk Strat Scores                           SBIRT 20yo+      Flowsheet Row Most Recent Value   Initial Alcohol Screen: US AUDIT-C     1. How often do you have a drink containing alcohol? 0 Filed at: 12/01/2024 0635   2. How many drinks containing alcohol do you have on a typical day you are drinking?  0 Filed at: 12/01/2024 0635   3a. Male UNDER 65: How often do you have five or more drinks on one occasion? 0 Filed at: 12/01/2024 0635   3b. FEMALE Any Age, or MALE 65+: How often do you have 4 or more drinks on one occassion? 0 Filed at: 12/01/2024 0635   Audit-C Score 0 Filed at: 12/01/2024 0635   KASEY: How many times in the past year have you...    Used an illegal drug or used a prescription medication for non-medical reasons? Never Filed at: 12/01/2024 0635                            History of Present Illness       Chief Complaint   Patient presents with    Shoulder Pain     Fall last Saturday and complaining of right sided shoulder pain        Past Medical History:   Diagnosis Date    History  of high cholesterol     Hypertension     Hypokalemia 8/15/2021    Impaired glucose tolerance     Stroke (HCC)       Past Surgical History:   Procedure Laterality Date    HERNIA REPAIR        Family History   Problem Relation Age of Onset    Stroke Mother     Heart disease Father     COPD Father     Cancer Brother     Hypertension Brother     No Known Problems Brother     No Known Problems Brother     No Known Problems Brother       Social History     Tobacco Use    Smoking status: Never    Smokeless tobacco: Never   Vaping Use    Vaping status: Never Used   Substance Use Topics    Alcohol use: Yes     Alcohol/week: 21.0 standard drinks of alcohol     Types: 21 Cans of beer per week     Comment: 2-3 beers a day    Drug use: Never      E-Cigarette/Vaping    E-Cigarette Use Never User       E-Cigarette/Vaping Substances    Nicotine No     THC No     CBD No     Flavoring No     Other No     Unknown No       I have reviewed and agree with the history as documented.     Patient was trying to take off his pants when he fell and landed on his right shoulder last Saturday.  Complains of continued pain.  Worse with movement.  No history of previous injury.      History provided by:  Patient   used: No    Shoulder Pain  Location:  Shoulder  Shoulder location:  R shoulder  Injury: yes    Time since incident:  1 week  Mechanism of injury: fall    Fall:     Fall occurred:  Standing    Point of impact: Right shoulder.    Entrapped after fall: no    Pain details:     Quality:  Aching    Radiates to:  Does not radiate    Severity:  Mild    Onset quality:  Sudden    Duration:  1 week    Timing:  Constant    Progression:  Worsening  Dislocation: no    Relieved by:  Nothing  Worsened by:  Movement  Ineffective treatments: Over-the-counter medications.  Associated symptoms: no back pain, no fever, no muscle weakness, no neck pain, no numbness, no swelling and no tingling        Review of Systems   Constitutional:   Negative for chills and fever.   HENT:  Negative for ear pain, hearing loss, sore throat, trouble swallowing and voice change.    Eyes:  Negative for pain and discharge.   Respiratory:  Negative for cough, shortness of breath and wheezing.    Cardiovascular:  Negative for chest pain and palpitations.   Gastrointestinal:  Negative for abdominal pain, blood in stool, constipation, diarrhea, nausea and vomiting.   Genitourinary:  Negative for dysuria, flank pain, frequency and hematuria.   Musculoskeletal:  Positive for arthralgias. Negative for back pain, joint swelling, neck pain and neck stiffness.   Skin:  Negative for rash and wound.   Neurological:  Negative for dizziness, seizures, syncope, facial asymmetry and headaches.   Psychiatric/Behavioral:  Negative for hallucinations, self-injury and suicidal ideas.    All other systems reviewed and are negative.          Objective       ED Triage Vitals [12/01/24 0635]   Temperature Pulse Blood Pressure Respirations SpO2 Patient Position - Orthostatic VS   98.4 °F (36.9 °C) 78 (!) 173/104 16 96 % Sitting      Temp Source Heart Rate Source BP Location FiO2 (%) Pain Score    Temporal Monitor Left arm -- 6      Vitals      Date and Time Temp Pulse SpO2 Resp BP Pain Score FACES Pain Rating User   12/01/24 0635 98.4 °F (36.9 °C) 78 96 % 16 173/104 6 -- AD            Physical Exam  Constitutional:       General: He is not in acute distress.     Appearance: Normal appearance. He is not ill-appearing.   HENT:      Head: Normocephalic and atraumatic.      Right Ear: External ear normal.      Left Ear: External ear normal.      Nose: Nose normal.      Mouth/Throat:      Mouth: Mucous membranes are moist.   Eyes:      Extraocular Movements: Extraocular movements intact.      Pupils: Pupils are equal, round, and reactive to light.   Neck:      Comments: Neck is nontender palpation midline.  Nontender along the right trapezius muscle.  Cardiovascular:      Rate and Rhythm: Normal  rate and regular rhythm.   Pulmonary:      Effort: Pulmonary effort is normal. No respiratory distress.      Breath sounds: Normal breath sounds.   Abdominal:      General: Abdomen is flat. Bowel sounds are normal. There is no distension.      Palpations: Abdomen is soft.      Tenderness: There is no abdominal tenderness.   Musculoskeletal:         General: No swelling or tenderness.      Cervical back: Normal range of motion and neck supple.      Comments: Decreased range of motion of the right shoulder.  No obvious bony deformity.  Radial pulse 2+.  Tender along the AC joint.   Skin:     General: Skin is warm and dry.      Capillary Refill: Capillary refill takes less than 2 seconds.   Neurological:      General: No focal deficit present.      Mental Status: He is alert and oriented to person, place, and time.   Psychiatric:         Mood and Affect: Mood normal.         Behavior: Behavior normal.         Results Reviewed       None            XR shoulder 2+ views RIGHT   ED Interpretation by Maximo Lundy MD (12/01 0650)   No fracture or dislocation noted          Splint application    Date/Time: 12/1/2024 6:52 AM    Performed by: Maximo Lundy MD  Authorized by: Maximo Lundy MD  [place] Protocol:  procedure performed by consultantConsent: Verbal consent obtained.  Consent given by: patient  Patient identity confirmed: verbally with patient    Pre-procedure details:     Sensation:  Normal  Procedure details:     Laterality:  Right    Location:  Shoulder    Shoulder:  R shoulder    Supplies:  Sling  Post-procedure details:     Pain:  Unchanged    Sensation:  Normal    Patient tolerance of procedure:  Tolerated well, no immediate complications      ED Medication and Procedure Management   Prior to Admission Medications   Prescriptions Last Dose Informant Patient Reported? Taking?   amLODIPine (NORVASC) 5 mg tablet  Self Yes No   Sig: Take 5 mg by mouth daily at bedtime   atorvastatin (LIPITOR) 40 mg  tablet   No No   Sig: Take 1 tablet (40 mg total) by mouth daily with dinner   clopidogrel (PLAVIX) 75 mg tablet   No No   Sig: Take 1 tablet (75 mg total) by mouth daily   ferrous sulfate 325 (65 Fe) mg tablet   No No   Sig: Take 1 tablet (325 mg total) by mouth 2 (two) times a day with meals   hydrochlorothiazide (HYDRODIURIL) 12.5 mg tablet   Yes No   Sig: Take 12.5 mg by mouth daily   ketorolac (TORADOL) 10 mg tablet   No No   Sig: Take 1 tablet (10 mg total) by mouth every 6 (six) hours as needed for mild pain or moderate pain   lisinopril (ZESTRIL) 10 mg tablet   No No   Sig: Take 1 tablet (10 mg total) by mouth daily   pantoprazole (PROTONIX) 40 mg tablet   No No   Sig: Take 1 tablet (40 mg total) by mouth daily in the early morning   senna-docusate sodium (SENOKOT S) 8.6-50 mg per tablet   No No   Sig: Take 1 tablet by mouth 2 (two) times a day      Facility-Administered Medications: None     Patient's Medications   Discharge Prescriptions    LIDOCAINE (LIDODERM) 5 %    Apply 1 patch topically over 12 hours daily for 7 days Apply to right shoulder pain region.  Remove & Discard patch within 12 hours or as directed by MD       Start Date: 12/1/2024 End Date: 12/8/2024       Order Dose: 1 patch       Quantity: 7 patch    Refills: 0     No discharge procedures on file.  ED SEPSIS DOCUMENTATION   Time reflects when diagnosis was documented in both MDM as applicable and the Disposition within this note       Time User Action Codes Description Comment    12/1/2024  6:38 AM Maximo Lundy Add [S49.91XA] Injury of right acromioclavicular joint, initial encounter                  Maximo Lundy MD  12/01/24 0652

## 2024-12-01 NOTE — DISCHARGE INSTRUCTIONS
You have been provided a sling to help with your right shoulder.  Please do not keep your right arm in the sling for prolonged periods of time.  Doing so can make your shoulder become even more stiff and lose its range of motion.  A few times each day please take your arm out of the sling and do range of motion exercises to help prevent you developing a frozen shoulder or a stiff shoulder.